# Patient Record
Sex: MALE | Race: WHITE | NOT HISPANIC OR LATINO | Employment: OTHER | URBAN - METROPOLITAN AREA
[De-identification: names, ages, dates, MRNs, and addresses within clinical notes are randomized per-mention and may not be internally consistent; named-entity substitution may affect disease eponyms.]

---

## 2017-02-17 ENCOUNTER — TRANSCRIBE ORDERS (OUTPATIENT)
Dept: ADMINISTRATIVE | Facility: HOSPITAL | Age: 82
End: 2017-02-17

## 2017-02-17 ENCOUNTER — APPOINTMENT (OUTPATIENT)
Dept: AUDIOLOGY | Facility: CLINIC | Age: 82
End: 2017-02-17
Payer: MEDICARE

## 2017-02-17 DIAGNOSIS — Z13.820 SPECIAL SCREENING FOR OSTEOPOROSIS: Primary | ICD-10-CM

## 2017-02-17 PROCEDURE — 92567 TYMPANOMETRY: CPT | Performed by: AUDIOLOGIST

## 2017-02-17 PROCEDURE — 92557 COMPREHENSIVE HEARING TEST: CPT | Performed by: AUDIOLOGIST

## 2017-03-03 ENCOUNTER — APPOINTMENT (OUTPATIENT)
Dept: RADIOLOGY | Facility: HOSPITAL | Age: 82
End: 2017-03-03
Attending: FAMILY MEDICINE
Payer: MEDICARE

## 2017-03-03 ENCOUNTER — HOSPITAL ENCOUNTER (OUTPATIENT)
Dept: RADIOLOGY | Facility: HOSPITAL | Age: 82
Discharge: HOME | End: 2017-03-03
Attending: FAMILY MEDICINE
Payer: MEDICARE

## 2017-03-03 DIAGNOSIS — Z13.820 SPECIAL SCREENING FOR OSTEOPOROSIS: ICD-10-CM

## 2017-03-03 DIAGNOSIS — Z13.820 ENCOUNTER FOR SCREENING FOR OSTEOPOROSIS: ICD-10-CM

## 2017-03-03 PROCEDURE — 77080 DXA BONE DENSITY AXIAL: CPT

## 2017-06-27 ENCOUNTER — APPOINTMENT (OUTPATIENT)
Dept: AUDIOLOGY | Facility: CLINIC | Age: 82
End: 2017-06-27
Payer: COMMERCIAL

## 2017-06-27 PROCEDURE — V5255 HEARING AID, DIGIT, MON, ITC: HCPCS | Performed by: AUDIOLOGIST

## 2017-07-01 ENCOUNTER — HOSPITAL ENCOUNTER (EMERGENCY)
Facility: HOSPITAL | Age: 82
Discharge: HOME | End: 2017-07-01
Attending: EMERGENCY MEDICINE | Admitting: EMERGENCY MEDICINE
Payer: MEDICARE

## 2017-07-01 VITALS
DIASTOLIC BLOOD PRESSURE: 74 MMHG | WEIGHT: 209 LBS | TEMPERATURE: 99.5 F | RESPIRATION RATE: 20 BRPM | SYSTOLIC BLOOD PRESSURE: 161 MMHG | HEART RATE: 79 BPM | OXYGEN SATURATION: 97 %
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
## 2017-07-01 PROCEDURE — 99282 EMERGENCY DEPT VISIT SF MDM: CPT

## 2017-07-01 RX ORDER — DOXYCYCLINE HYCLATE 100 MG/1
200 CAPSULE ORAL ONCE
Status: COMPLETED | OUTPATIENT
Start: 2017-07-01 | End: 2017-07-01

## 2017-07-01 RX ORDER — SIMVASTATIN 40 MG
40 TABLET ORAL
COMMUNITY
End: 2018-11-10 | Stop reason: SDUPTHER

## 2017-07-01 RX ORDER — LISINOPRIL AND HYDROCHLOROTHIAZIDE 20; 12.5 MG/1; MG/1
1 TABLET ORAL DAILY
COMMUNITY
End: 2018-11-10 | Stop reason: SDUPTHER

## 2017-07-01 RX ADMIN — DOXYCYCLINE 200 MG: 100 CAPSULE ORAL at 21:25

## 2017-10-09 ENCOUNTER — ALLSCRIPTS OFFICE VISIT (OUTPATIENT)
Dept: OTHER | Facility: OTHER | Age: 82
End: 2017-10-09

## 2017-11-02 ENCOUNTER — GENERIC CONVERSION - ENCOUNTER (OUTPATIENT)
Dept: OTHER | Facility: OTHER | Age: 82
End: 2017-11-02

## 2018-01-13 VITALS
BODY MASS INDEX: 30.64 KG/M2 | OXYGEN SATURATION: 99 % | SYSTOLIC BLOOD PRESSURE: 118 MMHG | DIASTOLIC BLOOD PRESSURE: 70 MMHG | RESPIRATION RATE: 16 BRPM | HEIGHT: 70 IN | WEIGHT: 214 LBS | HEART RATE: 72 BPM

## 2018-01-16 ENCOUNTER — HOSPITAL ENCOUNTER (OUTPATIENT)
Dept: RADIOLOGY | Facility: HOSPITAL | Age: 83
Discharge: HOME | End: 2018-01-16
Attending: FAMILY MEDICINE
Payer: MEDICARE

## 2018-01-16 ENCOUNTER — GENERIC CONVERSION - ENCOUNTER (OUTPATIENT)
Dept: OTHER | Facility: OTHER | Age: 83
End: 2018-01-16

## 2018-01-16 ENCOUNTER — APPOINTMENT (OUTPATIENT)
Dept: LAB | Facility: HOSPITAL | Age: 83
End: 2018-01-16
Attending: FAMILY MEDICINE
Payer: MEDICARE

## 2018-01-16 ENCOUNTER — TRANSCRIBE ORDERS (OUTPATIENT)
Dept: ADMINISTRATIVE | Facility: HOSPITAL | Age: 83
End: 2018-01-16

## 2018-01-16 DIAGNOSIS — J20.9 ACUTE BRONCHITIS: ICD-10-CM

## 2018-01-16 DIAGNOSIS — J20.9 ACUTE BRONCHITIS, UNSPECIFIED ORGANISM: Primary | ICD-10-CM

## 2018-01-16 LAB
ALBUMIN SERPL BCP-MCNC: 3 G/DL (ref 3.5–5)
ALP SERPL-CCNC: 71 U/L (ref 46–116)
ALT SERPL W P-5'-P-CCNC: 31 U/L (ref 12–78)
ANION GAP SERPL CALCULATED.3IONS-SCNC: 9 MMOL/L (ref 4–13)
AST SERPL W P-5'-P-CCNC: 24 U/L (ref 5–45)
BASOPHILS # BLD AUTO: 0 THOUSANDS/ΜL (ref 0–0.1)
BASOPHILS NFR BLD AUTO: 0 % (ref 0–1)
BILIRUB SERPL-MCNC: 2.1 MG/DL (ref 0.2–1)
BUN SERPL-MCNC: 21 MG/DL (ref 5–25)
CALCIUM SERPL-MCNC: 8.9 MG/DL (ref 8.3–10.1)
CHLORIDE SERPL-SCNC: 98 MMOL/L (ref 100–108)
CO2 SERPL-SCNC: 26 MMOL/L (ref 21–32)
CREAT SERPL-MCNC: 1.11 MG/DL (ref 0.6–1.3)
DOHLE BOD BLD QL SMEAR: PRESENT
EOSINOPHIL # BLD AUTO: 0 THOUSAND/ΜL (ref 0–0.61)
EOSINOPHIL NFR BLD AUTO: 0 % (ref 0–6)
ERYTHROCYTE [DISTWIDTH] IN BLOOD BY AUTOMATED COUNT: 14.1 % (ref 11.6–15.1)
GFR SERPL CREATININE-BSD FRML MDRD: 60 ML/MIN/1.73SQ M
GLUCOSE SERPL-MCNC: 166 MG/DL (ref 65–140)
HCT VFR BLD AUTO: 45.3 % (ref 42–52)
HGB BLD-MCNC: 15 G/DL (ref 14–18)
LYMPHOCYTES # BLD AUTO: 0.7 THOUSANDS/ΜL (ref 0.6–4.47)
LYMPHOCYTES NFR BLD AUTO: 5 % (ref 14–44)
MCH RBC QN AUTO: 30.2 PG (ref 27–31)
MCHC RBC AUTO-ENTMCNC: 33.2 G/DL (ref 31.4–37.4)
MCV RBC AUTO: 91 FL (ref 82–98)
MONOCYTES # BLD AUTO: 1.1 THOUSAND/ΜL (ref 0.17–1.22)
MONOCYTES NFR BLD AUTO: 7 % (ref 4–12)
NEUTROPHILS # BLD AUTO: 14.2 THOUSANDS/ΜL (ref 1.85–7.62)
NEUTS SEG NFR BLD AUTO: 89 % (ref 43–75)
NRBC BLD AUTO-RTO: 0 /100 WBCS
OVALOCYTES BLD QL SMEAR: PRESENT
PLATELET # BLD AUTO: 293 THOUSANDS/UL (ref 130–400)
PLATELET BLD QL SMEAR: ADEQUATE
PMV BLD AUTO: 7.8 FL (ref 8.9–12.7)
POLYCHROMASIA BLD QL SMEAR: PRESENT
POTASSIUM SERPL-SCNC: 3.6 MMOL/L (ref 3.5–5.3)
PROT SERPL-MCNC: 7.8 G/DL (ref 6.4–8.2)
RBC # BLD AUTO: 4.99 MILLION/UL (ref 4.7–6.1)
SODIUM SERPL-SCNC: 133 MMOL/L (ref 136–145)
WBC # BLD AUTO: 16 THOUSAND/UL (ref 4.8–10.8)

## 2018-01-16 PROCEDURE — 36415 COLL VENOUS BLD VENIPUNCTURE: CPT

## 2018-01-16 PROCEDURE — 85025 COMPLETE CBC W/AUTO DIFF WBC: CPT

## 2018-01-16 PROCEDURE — 71046 X-RAY EXAM CHEST 2 VIEWS: CPT

## 2018-01-16 PROCEDURE — 80053 COMPREHEN METABOLIC PANEL: CPT

## 2018-01-17 ENCOUNTER — GENERIC CONVERSION - ENCOUNTER (OUTPATIENT)
Dept: OTHER | Facility: OTHER | Age: 83
End: 2018-01-17

## 2018-01-23 NOTE — MISCELLANEOUS
Message  Message Free Text Note Form: Spoke with patient and wife both yesterday and today  His white count is 80005 and he has a left lower lobe pneumonia  He is feeling better with no dyspnea and no nausea or vomiting  He is able to eat and keep his medication down  If he develops fever chest pain nausea or vomiting, or dyspnea he is to go to the emergency room        Signatures   Electronically signed by : KANU Bazan ; Jan 17 2018  3:41PM EST                       (Author)

## 2018-01-24 VITALS
DIASTOLIC BLOOD PRESSURE: 80 MMHG | HEIGHT: 70 IN | WEIGHT: 210 LBS | SYSTOLIC BLOOD PRESSURE: 130 MMHG | BODY MASS INDEX: 30.06 KG/M2

## 2018-05-31 ENCOUNTER — HOSPITAL ENCOUNTER (EMERGENCY)
Facility: HOSPITAL | Age: 83
Discharge: HOME | End: 2018-05-31
Attending: EMERGENCY MEDICINE | Admitting: EMERGENCY MEDICINE
Payer: MEDICARE

## 2018-05-31 VITALS
SYSTOLIC BLOOD PRESSURE: 199 MMHG | RESPIRATION RATE: 18 BRPM | OXYGEN SATURATION: 97 % | DIASTOLIC BLOOD PRESSURE: 95 MMHG | WEIGHT: 201 LBS | BODY MASS INDEX: 29.26 KG/M2 | HEART RATE: 74 BPM | TEMPERATURE: 99.2 F
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
## 2018-05-31 PROCEDURE — 99281 EMR DPT VST MAYX REQ PHY/QHP: CPT

## 2018-05-31 RX ORDER — CEPHALEXIN 500 MG/1
500 CAPSULE ORAL ONCE
Status: COMPLETED | OUTPATIENT
Start: 2018-05-31 | End: 2018-05-31

## 2018-05-31 RX ORDER — CEPHALEXIN 500 MG/1
500 CAPSULE ORAL EVERY 12 HOURS SCHEDULED
Qty: 14 CAPSULE | Refills: 0 | Status: SHIPPED | OUTPATIENT
Start: 2018-05-31 | End: 2018-06-07

## 2018-05-31 RX ADMIN — CEPHALEXIN 500 MG: 500 CAPSULE ORAL at 13:16

## 2018-05-31 NOTE — DISCHARGE INSTRUCTIONS
Please take a list of all of your medications and discharge paperwork with you to all of your follow-up medical visits  Please take all of your medications as directed  Please call your family doctor or return to the ER if you have increased shortness of breath, chest pain, fevers, chills, nausea, vomiting, diarrhea, or any other worsening symptoms  Insect Bite or Sting   WHAT YOU NEED TO KNOW:   Most insect bites and stings are not dangerous and go away without treatment  Your symptoms may be mild, or you may develop anaphylaxis  Anaphylaxis is a sudden, life-threatening reaction that needs immediate treatment  Common examples of insects that bite or sting are bees, ticks, mosquitoes, spiders, and ants  Insect bites or stings can lead to diseases such as malaria, West Nile virus, Lyme disease, or [place] Spotted Fever  DISCHARGE INSTRUCTIONS:   Call 911 for signs or symptoms of anaphylaxis,  such as trouble breathing, swelling in your mouth or throat, or wheezing  You may also have itching, a rash, hives, or feel like you are going to faint  Return to the emergency department if:   · You are stung on your tongue or in your throat  · A white area forms around the bite  · You are sweating badly or have body pain  · You think you were bitten or stung by a poisonous insect  Contact your healthcare provider if:   · You have a fever  · The area becomes red, warm, tender, and swollen beyond the area of the bite or sting  · You have questions or concerns about your condition or care  Medicines:   · Antihistamines  decrease itching and rash  · Epinephrine  is used to treat severe allergic reactions such as anaphylaxis  · Take your medicine as directed  Contact your healthcare provider if you think your medicine is not helping or if you have side effects  Tell him of her if you are allergic to any medicine  Keep a list of the medicines, vitamins, and herbs you take   Include the amounts, and when and why you take them  Bring the list or the pill bottles to follow-up visits  Carry your medicine list with you in case of an emergency  Steps to take for signs or symptoms of anaphylaxis:   · Immediately  give 1 shot of epinephrine only into the outer thigh muscle  · Leave the shot in place  as directed  Your healthcare provider may recommend you leave it in place for up to 10 seconds before you remove it  This helps make sure all of the epinephrine is delivered  · Call 911 and go to the emergency department,  even if the shot improved symptoms  Do not drive yourself  Bring the used epinephrine shot with you  Safety precautions to take if you are at risk for anaphylaxis:   · Keep 2 shots of epinephrine with you at all times  You may need a second shot, because epinephrine only works for about 20 minutes and symptoms may return  Your healthcare provider can show you and family members how to give the shot  Check the expiration date every month and replace it before it expires  · Create an action plan  Your healthcare provider can help you create a written plan that explains the allergy and an emergency plan to treat a reaction  The plan explains when to give a second epinephrine shot if symptoms return or do not improve after the first  Give copies of the action plan and emergency instructions to family members, work and school staff, and  providers  Show them how to give a shot of epinephrine  · Carry medical alert identification  Wear medical alert jewelry or carry a card that says you have an insect allergy  Ask your healthcare provider where to get these items  If an insect bites or stings you:   · Remove the stinger  Scrape the stinger out with your fingernail, edge of a credit card, or a knife blade  Do not squeeze the wound  Gently wash the area with soap and water  · Remove the tick    Ticks must be removed as soon as possible so you do not get diseases passed through tick bites  Ask your healthcare provider for more information on tick bites and how to remove ticks  Care for a bite or sting wound:   · Elevate the affected area  Prop the wound above the level of your heart, if possible  Elevate the area for 10 to 20 minutes each hour or as directed by your healthcare provider  · Use compresses  Soak a clean washcloth in cold water, wring it out, and put it on the bite or sting  Use the compress for 10 to 20 minutes each hour or as directed by your healthcare provider  After 24 to 48 hours, change to warm compresses  · Apply a paste  Add water to baking soda to make a thick paste  Put the paste on the area for 5 minutes  Rinse gently to remove the paste  Prevent another insect bite or sting:   · Do not wear bright-colored or flower-print clothing when you plan to spend time outdoors  Do not use hairspray, perfumes, or aftershave  · Do not leave food out  · Empty any standing water and wash container with soap and water every 2 days  · Put screens on all open windows and doors  · Put insect repellent that contains DEET on skin that is showing when you go outside  Put insect repellent at the top of your boots, bottom of pant legs, and sleeve cuffs  Wear long sleeves, pants, and shoes  · Use citronella candles outdoors to help keep mosquitoes away  Put a tick and flea collar on pets  Follow up with your healthcare provider as directed:  Write down your questions so you remember to ask them during your visits  © 2017 [employer]  Information is for End User's use only and may not be sold, redistributed or otherwise used for commercial purposes  All illustrations and images included in CareNotes® are the copyrighted property of [employer]  or [employer]  The above information is an  only  It is not intended as medical advice for individual conditions or treatments   Talk to your doctor, nurse or pharmacist before following any medical regimen to see if it is safe and effective for you

## 2018-05-31 NOTE — ED NOTES
Patient informed that he will wait one half hour past the time he is given antibiotic to observe him and make sure he does not have an allergic reaction       Maureen Fermin RN  05/31/18 6989

## 2018-05-31 NOTE — ED PROVIDER NOTES
History  Chief Complaint   Patient presents with   [address]     patient was bit by something on right lower back area is slightly hard and red to touch denies pain but does itch     80year-old male presents to the ER for evaluation of an insect bite  Patient states that he was bit by something a few days ago and now the area feels indurated and the redness is spreading  Patient does not feel any pain but does have lot of itchiness  Patient came to the ER for further evaluation  Patient denies any fevers, chills, nausea, vomiting, diarrhea, dysuria, headaches, weakness, dizziness  History provided by:  Patient  Insect Bite   Associated symptoms: rash    Associated symptoms: no fever        Prior to Admission Medications   Prescriptions Last Dose Informant Patient Reported? Taking? AMLODIPINE BESYLATE PO   Yes No   Sig: Take 5 mg by mouth daily   lisinopril-hydrochlorothiazide (PRINZIDE,ZESTORETIC) 20-12 5 MG per tablet   Yes No   Sig: Take 1 tablet by mouth daily   metoprolol tartrate (LOPRESSOR) 25 mg tablet   Yes No   Sig: Take 25 mg by mouth every 12 (twelve) hours   simvastatin (ZOCOR) 40 mg tablet   Yes No   Sig: Take 40 mg by mouth daily at bedtime      Facility-Administered Medications: None       Past Medical History:   Diagnosis Date    Hyperlipidemia     Hypertension        Past Surgical History:   Procedure Laterality Date    A-V CARDIAC PACEMAKER INSERTION         History reviewed  No pertinent family history  I have reviewed and agree with the history as documented  Social History   Substance Use Topics    Smoking status: Former Smoker    Smokeless tobacco: Never Used    Alcohol use No        Review of Systems   Constitutional: Negative for activity change, fatigue and fever  HENT: Negative for congestion, ear discharge and sore throat  Eyes: Negative for pain and redness  Respiratory: Negative for cough, chest tightness, shortness of breath and wheezing  Cardiovascular: Negative for chest pain  Gastrointestinal: Negative for abdominal pain, diarrhea, nausea and vomiting  Endocrine: Negative for cold intolerance  Genitourinary: Negative for dysuria and urgency  Musculoskeletal: Negative for arthralgias and back pain  Skin: Positive for rash  Neurological: Negative for dizziness, weakness and headaches  Psychiatric/Behavioral: Negative for agitation and behavioral problems  Physical Exam  Physical Exam   Constitutional: He is oriented to person, place, and time  He appears well-developed and well-nourished  HENT:   Head: Normocephalic and atraumatic  Nose: Nose normal    Mouth/Throat: Oropharynx is clear and moist    Eyes: Conjunctivae and EOM are normal    Neck: Normal range of motion  Neck supple  Cardiovascular: Normal rate, regular rhythm and normal heart sounds  Pulmonary/Chest: Effort normal and breath sounds normal    Abdominal: Soft  Bowel sounds are normal  He exhibits no distension  There is no tenderness  Musculoskeletal: Normal range of motion  Neurological: He is alert and oriented to person, place, and time  Skin: Skin is warm  0 5 cm x 0 5 cm area of induration noted to the right flank region of the lower back with some surrounding erythema that is spreading laterally  The area is not warm to touch or tender to palpation  There area is pruritic in nature  No area of fluctuance or discharge noted on examination  Psychiatric: He has a normal mood and affect  His behavior is normal  Judgment and thought content normal    Nursing note and vitals reviewed        Vital Signs  ED Triage Vitals [05/31/18 1221]   Temperature Pulse Respirations Blood Pressure SpO2   99 2 °F (37 3 °C) 74 18 (!) 199/95 97 %      Temp Source Heart Rate Source Patient Position - Orthostatic VS BP Location FiO2 (%)   Tympanic Monitor Sitting Right arm --      Pain Score       --           Vitals:    05/31/18 1221   BP: (!) 199/95   Pulse: 74 Patient Position - Orthostatic VS: Sitting       Visual Acuity      ED Medications  Medications   cephalexin (KEFLEX) capsule 500 mg (500 mg Oral Given 5/31/18 1316)       Diagnostic Studies  Results Reviewed     None                 No orders to display              Procedures  Procedures       Phone Contacts  ED Phone Contact    ED Course                               MDM  Number of Diagnoses or Management Options  Bug bite with infection, initial encounter: new and requires workup  Risk of Complications, Morbidity, and/or Mortality  General comments: Patient's history and physical is consistent with infected insect bite  Patient has history of penicillin allergy  Patient could not recall whether he has ever taken Keflex before  Patient was given Keflex in the ER and will observed for half an hour without any worsening symptoms  At this point patient was discharged home on Keflex and hydrocortisone ointment with follow-up to PCP in3-4 days  Close return instructions given to return to the ER for any worsening symptoms  Patient agrees with discharge plan  Patient well appearing at time of discharge  Patient Progress  Patient progress: stable    CritCare Time    Disposition  Final diagnoses:   Bug bite with infection, initial encounter     Time reflects when diagnosis was documented in both MDM as applicable and the Disposition within this note     Time User Action Codes Description Comment    5/31/2018  1:05 PM Shawn Goyal Add Myrna Maurice  XXXA] Bug bite with infection, initial encounter       ED Disposition     ED Disposition Condition Comment    Discharge  Anson Ana Paula discharge to home/self care      Condition at discharge: Good        Follow-up Information     Follow up With Specialties Details Why [address], MD Family Medicine In 4 days  [facility]  [address] [place] [address]  [phone]            Discharge Medication List as of 5/31/2018  1:10 PM      START taking these medications    Details   cephalexin (KEFLEX) 500 mg capsule Take 1 capsule (500 mg total) by mouth every 12 (twelve) hours for 7 days, Starting u 5/31/2018, Until u 6/7/2018, Print      hydrocortisone 2 5 % ointment Apply topically 2 (two) times a day for 7 days, Starting u 5/31/2018, Until u 6/7/2018, Print         CONTINUE these medications which have NOT CHANGED    Details   AMLODIPINE BESYLATE PO Take 5 mg by mouth daily, Historical Med      lisinopril-hydrochlorothiazide (PRINZIDE,ZESTORETIC) 20-12 5 MG per tablet Take 1 tablet by mouth daily, Historical Med      metoprolol tartrate (LOPRESSOR) 25 mg tablet Take 25 mg by mouth every 12 (twelve) hours, Historical Med      simvastatin (ZOCOR) 40 mg tablet Take 40 mg by mouth daily at bedtime, Historical Med           No discharge procedures on file      ED Provider  Electronically Signed by           Kary Rao DO  05/31/18 0512

## 2018-06-05 ENCOUNTER — VBI (OUTPATIENT)
Dept: FAMILY MEDICINE CLINIC | Facility: CLINIC | Age: 83
End: 2018-06-05

## 2018-06-05 NOTE — TELEPHONE ENCOUNTER
Pt was seen in [address] on 5/31/18  CC: Insect Bite  DX: Bug bite with infection  Left message  Informed Pt of  on call, office hours and phone number

## 2018-06-25 ENCOUNTER — APPOINTMENT (OUTPATIENT)
Dept: RADIOLOGY | Facility: CLINIC | Age: 83
End: 2018-06-25
Payer: MEDICARE

## 2018-06-25 VITALS
HEIGHT: 67 IN | HEART RATE: 60 BPM | WEIGHT: 208 LBS | BODY MASS INDEX: 32.65 KG/M2 | DIASTOLIC BLOOD PRESSURE: 80 MMHG | SYSTOLIC BLOOD PRESSURE: 171 MMHG

## 2018-06-25 DIAGNOSIS — M25.561 PAIN IN BOTH KNEES, UNSPECIFIED CHRONICITY: ICD-10-CM

## 2018-06-25 DIAGNOSIS — M25.562 PAIN IN BOTH KNEES, UNSPECIFIED CHRONICITY: ICD-10-CM

## 2018-06-25 DIAGNOSIS — M17.0 PRIMARY OSTEOARTHRITIS OF BOTH KNEES: Primary | ICD-10-CM

## 2018-06-25 PROCEDURE — 73562 X-RAY EXAM OF KNEE 3: CPT

## 2018-06-25 PROCEDURE — 20610 DRAIN/INJ JOINT/BURSA W/O US: CPT | Performed by: ORTHOPAEDIC SURGERY

## 2018-06-25 PROCEDURE — 99213 OFFICE O/P EST LOW 20 MIN: CPT | Performed by: ORTHOPAEDIC SURGERY

## 2018-06-25 RX ORDER — DEXAMETHASONE SODIUM PHOSPHATE 100 MG/10ML
40 INJECTION INTRAMUSCULAR; INTRAVENOUS
Status: COMPLETED | OUTPATIENT
Start: 2018-06-25 | End: 2018-06-25

## 2018-06-25 RX ORDER — LIDOCAINE HYDROCHLORIDE 10 MG/ML
4 INJECTION, SOLUTION INFILTRATION; PERINEURAL
Status: COMPLETED | OUTPATIENT
Start: 2018-06-25 | End: 2018-06-25

## 2018-06-25 RX ADMIN — DEXAMETHASONE SODIUM PHOSPHATE 40 MG: 100 INJECTION INTRAMUSCULAR; INTRAVENOUS at 11:33

## 2018-06-25 RX ADMIN — LIDOCAINE HYDROCHLORIDE 4 ML: 10 INJECTION, SOLUTION INFILTRATION; PERINEURAL at 11:33

## 2018-06-25 RX ADMIN — DEXAMETHASONE SODIUM PHOSPHATE 40 MG: 100 INJECTION INTRAMUSCULAR; INTRAVENOUS at 11:37

## 2018-06-25 RX ADMIN — LIDOCAINE HYDROCHLORIDE 4 ML: 10 INJECTION, SOLUTION INFILTRATION; PERINEURAL at 11:37

## 2018-07-14 ENCOUNTER — HOSPITAL ENCOUNTER (EMERGENCY)
Facility: HOSPITAL | Age: 83
Discharge: HOME | End: 2018-07-14
Attending: EMERGENCY MEDICINE | Admitting: EMERGENCY MEDICINE
Payer: MEDICARE

## 2018-07-14 VITALS
DIASTOLIC BLOOD PRESSURE: 88 MMHG | RESPIRATION RATE: 20 BRPM | OXYGEN SATURATION: 99 % | SYSTOLIC BLOOD PRESSURE: 203 MMHG | TEMPERATURE: 98.9 F | HEART RATE: 94 BPM

## 2018-07-14 DIAGNOSIS — S80.861A TICK BITE OF RIGHT LOWER LEG, INITIAL ENCOUNTER: Primary | ICD-10-CM
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
## 2018-07-14 PROCEDURE — 99281 EMR DPT VST MAYX REQ PHY/QHP: CPT

## 2018-07-14 RX ORDER — MUPIROCIN CALCIUM 20 MG/G
CREAM TOPICAL 3 TIMES DAILY
Qty: 15 G | Refills: 0 | Status: SHIPPED | OUTPATIENT
Start: 2018-07-14 | End: 2021-12-20

## 2018-07-14 RX ORDER — DOXYCYCLINE HYCLATE 100 MG/1
200 CAPSULE ORAL ONCE
Status: DISCONTINUED | OUTPATIENT
Start: 2018-07-14 | End: 2018-07-14 | Stop reason: HOSPADM

## 2018-07-14 NOTE — ED PROVIDER NOTES
History  Chief Complaint   Patient presents with   [address]     Pt states he came in to be checked out after pulling engourged tick off of his RLE     Pt presents after a tick bite  He states that he noticed the tick tonight, pulled it off and came to the ER for further evaluation  Pt denies other somatic complaints            Prior to Admission Medications   Prescriptions Last Dose Informant Patient Reported? Taking? AMLODIPINE BESYLATE PO   Yes No   Sig: Take 5 mg by mouth daily   hydrocortisone 2 5 % ointment   No No   Sig: Apply topically 2 (two) times a day for 7 days   lisinopril-hydrochlorothiazide (PRINZIDE,ZESTORETIC) 20-12 5 MG per tablet   Yes No   Sig: Take 1 tablet by mouth daily   metoprolol tartrate (LOPRESSOR) 25 mg tablet   Yes No   Sig: Take 25 mg by mouth every 12 (twelve) hours   simvastatin (ZOCOR) 40 mg tablet   Yes No   Sig: Take 40 mg by mouth daily at bedtime      Facility-Administered Medications: None       Past Medical History:   Diagnosis Date    Hyperlipidemia     Hypertension        Past Surgical History:   Procedure Laterality Date    A-V CARDIAC PACEMAKER INSERTION         History reviewed  No pertinent family history  I have reviewed and agree with the history as documented  Social History   Substance Use Topics    Smoking status: Former Smoker    Smokeless tobacco: Never Used    Alcohol use No        Review of Systems   Constitutional: Negative for chills and fever  All other systems reviewed and are negative  Physical Exam  Physical Exam   Constitutional: He appears well-developed and well-nourished  Skin:        Scab noted laterally on right leg  Nursing note and vitals reviewed        Vital Signs  ED Triage Vitals [07/14/18 0042]   Temperature Pulse Respirations Blood Pressure SpO2   98 9 °F (37 2 °C) 94 20 (!) 203/88 99 %      Temp src Heart Rate Source Patient Position - Orthostatic VS BP Location FiO2 (%)   -- Monitor Sitting Right arm -- Pain Score       No Pain           Vitals:    07/14/18 0042   BP: (!) 203/88   Pulse: 94   Patient Position - Orthostatic VS: Sitting       Visual Acuity      ED Medications  Medications - No data to display    Diagnostic Studies  Results Reviewed     None                 No orders to display              Procedures  Procedures       Phone Contacts  ED Phone Contact    ED Course                               MDM  Number of Diagnoses or Management Options  Diagnosis management comments: Wound cleaned by RN  Will give prophylactic dose of doxy and d/c to home    CritCare Time    Disposition  Final diagnoses:   Tick bite of right lower leg, initial encounter     Time reflects when diagnosis was documented in both MDM as applicable and the Disposition within this note     Time User Action Codes Description Comment    7/14/2018  2:00 AM Josef Werner Add [[id number],  W57  XXXA] Tick bite of right lower leg, initial encounter       ED Disposition     ED Disposition Condition Comment    Discharge  Leslee Boateng discharge to home/self care      Condition at discharge: Stable        Follow-up Information     Follow up With Specialties Details Why [facility], [address]   [facility]  [address] [place] [address]  [phone]            Discharge Medication List as of 7/14/2018  2:01 AM      START taking these medications    Details   mupirocin (BACTROBAN) 2 % cream Apply topically 3 (three) times a day, Starting Sat 7/14/2018, Normal         CONTINUE these medications which have NOT CHANGED    Details   AMLODIPINE BESYLATE PO Take 5 mg by mouth daily, Historical Med      hydrocortisone 2 5 % ointment Apply topically 2 (two) times a day for 7 days, Starting Thu 5/31/2018, Until Thu 6/7/2018, Print      lisinopril-hydrochlorothiazide (PRINZIDE,ZESTORETIC) 20-12 5 MG per tablet Take 1 tablet by mouth daily, Historical Med      metoprolol tartrate (LOPRESSOR) 25 mg tablet Take 25 mg by mouth every 12 (twelve) hours, Historical Med      simvastatin (ZOCOR) 40 mg tablet Take 40 mg by mouth daily at bedtime, Historical Med           No discharge procedures on file      ED Provider  Electronically Signed by           Elver Flores DO  07/14/18 0684

## 2018-07-14 NOTE — DISCHARGE INSTRUCTIONS
Tick Bite   WHAT YOU NEED TO KNOW:   Most tick bites are not dangerous, but ticks can pass disease or infection when they bite  Ticks need to be removed quickly  You may have redness, pain, itching, and swelling near the bite  Blisters may also develop  DISCHARGE INSTRUCTIONS:   Return to the emergency department if:   · You have trouble walking or moving your legs  · You have joint pain, muscle pain, or muscle weakness within 1 month of a tick bite  · You have a fever, chills, headache, or rash  Contact your healthcare provider if:   · You cannot remove the tick  · The tick's head is stuck in your skin  · You have questions or concerns about your condition or care  Medicines:   · Medicines  help decrease pain, redness, itching, and swelling  You may also need medicine to prevent or fight a bacterial infection  These medicines may be given as a cream, lotion, or pill  · Take your medicine as directed  Contact your healthcare provider if you think your medicine is not helping or if you have side effects  Tell him of her if you are allergic to any medicine  Keep a list of the medicines, vitamins, and herbs you take  Include the amounts, and when and why you take them  Bring the list or the pill bottles to follow-up visits  Carry your medicine list with you in case of an emergency  How to remove a tick:  Remove the tick as soon as possible to help prevent disease or infection  You are less likely to get sick from a tick bite if you remove the tick within 24 hours  Do not use petroleum jelly, nail polish, rubbing alcohol, or heat  These do not work and may be dangerous  Do the following to remove a tick:  · First, try a soapy cotton ball  Soak a cotton ball in liquid soap  Cover the tick with the cotton ball for 30 seconds  The tick may come off with the cotton ball when you pull it away  · Use tweezers if the soapy cotton ball does not work  Grasp the tick as close to your skin as possible  Pull the tick straight up and out  Do not touch the tick with your bare hands  · Do not twist or jerk the tick suddenly, because this may break off the tick's head or mouth parts  Do not leave any part of the tick in your skin  · Do not crush or squeeze the tick since its body may be infected with germs  Flush the tick down the toilet  · After the tick is removed, clean the area of the bite with rubbing alcohol  Then wash your hands with soap and water  Apply ice  on your bite for 15 to 20 minutes every hour or as directed  Use an ice pack, or put crushed ice in a plastic bag  Cover it with a towel before you apply it to your skin  Ice helps prevent tissue damage and decreases swelling and pain  Prevent a tick bite:  Ticks live in areas covered by brush and grass  They may even be found in your lawn if you live in certain areas  Outdoor pets can carry ticks inside the house  Ticks can grab onto you or your clothes when you walk by grass or brush  If you go into areas that contain many trees, tall grasses, and underbrush, do the following:  · Wear light colored pants and a long-sleeved shirt  Tuck your pants into your socks or boots  Tuck in your shirt  Wear sleeves that fit close to the skin at your wrists and neck  This will help prevent ticks from crawling through gaps in your clothing and onto your skin  Wear a hat in areas with trees  · Apply insect repellant on your skin  The insect repellant should contain DEET  Do not put insect repellant on skin that is cut, scratched, or irritated  Always use soap and water to wash the insect repellant off as soon as possible once you are indoors  Do not apply insect repellant on your child's face or hands  · Spray insect repellant onto your clothes  Use permethrin spray  This spray kills ticks that crawl on your clothing  Be sure to spray the tops of your boots, bottom of pant legs, and sleeve cuffs   As soon as possible, wash and dry clothing in hot water and high heat  · Check your clothing, hair, and skin for ticks  Shower within 2 hours of coming indoors  Carefully check the hairline, armpits, neck, and waist  Check your pets and children for ticks  Remove ticks from pets the same way as you remove them from people  · Decrease the risk for ticks in your yard  Ticks like to live in shady, moist areas  [place] your lawn regularly to keep the grass short  Trim the grass around birdbaths and fences  Cut branches that are overgrown and take them out of the yard  Clear out leaf piles  [place] firewood in a dry, jame area  Follow up with your healthcare provider as directed:  Write down your questions so you remember to ask them during your visits  © 2017 [employer] Information is for End User's use only and may not be sold, redistributed or otherwise used for commercial purposes  All illustrations and images included in CareNotes® are the copyrighted property of [employer]  or [employer]  The above information is an  only  It is not intended as medical advice for individual conditions or treatments  Talk to your doctor, nurse or pharmacist before following any medical regimen to see if it is safe and effective for you

## 2018-07-16 ENCOUNTER — VBI (OUTPATIENT)
Dept: FAMILY MEDICINE CLINIC | Facility: CLINIC | Age: 83
End: 2018-07-16

## 2018-07-16 NOTE — TELEPHONE ENCOUNTER
Pt was seen in [address] on 7/14/18,  CC: Insect Bite  DX: Tick bite of right lower leg  Pt will call back if F/u appt is needed

## 2018-09-05 ENCOUNTER — TRANSCRIBE ORDERS (OUTPATIENT)
Dept: ADMINISTRATIVE | Facility: HOSPITAL | Age: 83
End: 2018-09-05

## 2018-09-05 DIAGNOSIS — R60.9 EDEMA, UNSPECIFIED TYPE: Primary | ICD-10-CM

## 2018-09-11 ENCOUNTER — HOSPITAL ENCOUNTER (OUTPATIENT)
Dept: RADIOLOGY | Facility: HOSPITAL | Age: 83
Discharge: HOME | End: 2018-09-11
Payer: MEDICARE

## 2018-09-11 DIAGNOSIS — R60.9 EDEMA, UNSPECIFIED TYPE: ICD-10-CM

## 2018-09-11 PROCEDURE — 93971 EXTREMITY STUDY: CPT

## 2018-09-12 PROCEDURE — 93971 EXTREMITY STUDY: CPT | Performed by: SURGERY

## 2018-11-10 DIAGNOSIS — E78.5 HYPERLIPIDEMIA, UNSPECIFIED HYPERLIPIDEMIA TYPE: ICD-10-CM

## 2018-11-10 DIAGNOSIS — I10 ESSENTIAL HYPERTENSION: Primary | ICD-10-CM

## 2018-11-10 RX ORDER — LISINOPRIL AND HYDROCHLOROTHIAZIDE 20; 12.5 MG/1; MG/1
TABLET ORAL
Qty: 90 TABLET | Refills: 3 | Status: SHIPPED | OUTPATIENT
Start: 2018-11-10 | End: 2019-12-22 | Stop reason: SDUPTHER

## 2018-11-10 RX ORDER — SIMVASTATIN 40 MG
TABLET ORAL
Qty: 90 TABLET | Refills: 3 | Status: SHIPPED | OUTPATIENT
Start: 2018-11-10 | End: 2019-12-22 | Stop reason: SDUPTHER

## 2018-11-10 RX ORDER — AMLODIPINE BESYLATE 5 MG/1
TABLET ORAL
Qty: 90 TABLET | Refills: 3 | Status: SHIPPED | OUTPATIENT
Start: 2018-11-10 | End: 2019-12-28 | Stop reason: SDUPTHER

## 2019-09-10 ENCOUNTER — APPOINTMENT (OUTPATIENT)
Dept: LAB | Facility: HOSPITAL | Age: 84
End: 2019-09-10
Payer: MEDICARE

## 2019-09-10 ENCOUNTER — TRANSCRIBE ORDERS (OUTPATIENT)
Dept: ADMINISTRATIVE | Facility: HOSPITAL | Age: 84
End: 2019-09-10

## 2019-09-10 DIAGNOSIS — I10 ESSENTIAL HYPERTENSION, MALIGNANT: ICD-10-CM

## 2019-09-10 DIAGNOSIS — E78.2 MIXED HYPERLIPIDEMIA: ICD-10-CM

## 2019-09-10 DIAGNOSIS — I10 ESSENTIAL HYPERTENSION, MALIGNANT: Primary | ICD-10-CM

## 2019-09-10 DIAGNOSIS — R60.0 LOCALIZED EDEMA: ICD-10-CM

## 2019-09-10 LAB
ALBUMIN SERPL BCP-MCNC: 3.6 G/DL (ref 3.5–5)
ALP SERPL-CCNC: 38 U/L (ref 46–116)
ALT SERPL W P-5'-P-CCNC: 29 U/L (ref 12–78)
ANION GAP SERPL CALCULATED.3IONS-SCNC: 8 MMOL/L (ref 4–13)
AST SERPL W P-5'-P-CCNC: 27 U/L (ref 5–45)
BILIRUB SERPL-MCNC: 1 MG/DL (ref 0.2–1)
BILIRUB UR QL STRIP: NEGATIVE
BUN SERPL-MCNC: 25 MG/DL (ref 5–25)
CALCIUM SERPL-MCNC: 8.8 MG/DL (ref 8.3–10.1)
CHLORIDE SERPL-SCNC: 103 MMOL/L (ref 100–108)
CHOLEST SERPL-MCNC: 92 MG/DL (ref 50–200)
CK SERPL-CCNC: 70 U/L (ref 39–308)
CLARITY UR: CLEAR
CO2 SERPL-SCNC: 29 MMOL/L (ref 21–32)
COLOR UR: YELLOW
CREAT SERPL-MCNC: 1.18 MG/DL (ref 0.6–1.3)
GFR SERPL CREATININE-BSD FRML MDRD: 55 ML/MIN/1.73SQ M
GLUCOSE P FAST SERPL-MCNC: 135 MG/DL (ref 65–99)
GLUCOSE UR STRIP-MCNC: NEGATIVE MG/DL
HDLC SERPL-MCNC: 28 MG/DL (ref 40–60)
HGB UR QL STRIP.AUTO: NEGATIVE
KETONES UR STRIP-MCNC: NEGATIVE MG/DL
LDLC SERPL CALC-MCNC: 43 MG/DL (ref 0–100)
LEUKOCYTE ESTERASE UR QL STRIP: NEGATIVE
MAGNESIUM SERPL-MCNC: 2.2 MG/DL (ref 1.6–2.6)
NITRITE UR QL STRIP: NEGATIVE
NONHDLC SERPL-MCNC: 64 MG/DL
NT-PROBNP SERPL-MCNC: 103 PG/ML
PH UR STRIP.AUTO: 6 [PH]
POTASSIUM SERPL-SCNC: 3.2 MMOL/L (ref 3.5–5.3)
PROT SERPL-MCNC: 7.1 G/DL (ref 6.4–8.2)
PROT UR STRIP-MCNC: NEGATIVE MG/DL
SODIUM SERPL-SCNC: 140 MMOL/L (ref 136–145)
SP GR UR STRIP.AUTO: 1.01 (ref 1–1.03)
T4 FREE SERPL-MCNC: 0.9 NG/DL (ref 0.76–1.46)
TRIGL SERPL-MCNC: 107 MG/DL
TSH SERPL DL<=0.05 MIU/L-ACNC: 3.84 UIU/ML (ref 0.36–3.74)
UROBILINOGEN UR QL STRIP.AUTO: 0.2 E.U./DL

## 2019-09-10 PROCEDURE — 80061 LIPID PANEL: CPT | Performed by: INTERNAL MEDICINE

## 2019-09-10 PROCEDURE — 83735 ASSAY OF MAGNESIUM: CPT

## 2019-09-10 PROCEDURE — 87086 URINE CULTURE/COLONY COUNT: CPT

## 2019-09-10 PROCEDURE — 84443 ASSAY THYROID STIM HORMONE: CPT

## 2019-09-10 PROCEDURE — 83880 ASSAY OF NATRIURETIC PEPTIDE: CPT

## 2019-09-10 PROCEDURE — 84439 ASSAY OF FREE THYROXINE: CPT

## 2019-09-10 PROCEDURE — 36415 COLL VENOUS BLD VENIPUNCTURE: CPT

## 2019-09-10 PROCEDURE — 82550 ASSAY OF CK (CPK): CPT

## 2019-09-10 PROCEDURE — 80053 COMPREHEN METABOLIC PANEL: CPT | Performed by: INTERNAL MEDICINE

## 2019-09-10 PROCEDURE — 81003 URINALYSIS AUTO W/O SCOPE: CPT | Performed by: INTERNAL MEDICINE

## 2019-09-11 LAB — BACTERIA UR CULT: NORMAL

## 2019-10-11 ENCOUNTER — OFFICE VISIT (OUTPATIENT)
Dept: OBGYN CLINIC | Facility: CLINIC | Age: 84
End: 2019-10-11
Payer: MEDICARE

## 2019-10-11 VITALS
BODY MASS INDEX: 32.77 KG/M2 | DIASTOLIC BLOOD PRESSURE: 76 MMHG | SYSTOLIC BLOOD PRESSURE: 145 MMHG | WEIGHT: 209.2 LBS | HEART RATE: 60 BPM

## 2019-10-11 DIAGNOSIS — M17.0 PRIMARY OSTEOARTHRITIS OF BOTH KNEES: Primary | ICD-10-CM

## 2019-10-11 DIAGNOSIS — M25.561 PAIN IN BOTH KNEES, UNSPECIFIED CHRONICITY: ICD-10-CM

## 2019-10-11 DIAGNOSIS — M25.562 PAIN IN BOTH KNEES, UNSPECIFIED CHRONICITY: ICD-10-CM

## 2019-10-11 PROCEDURE — 99213 OFFICE O/P EST LOW 20 MIN: CPT | Performed by: ORTHOPAEDIC SURGERY

## 2019-10-11 NOTE — PROGRESS NOTES
Assessment/Plan:  1  Primary osteoarthritis of both knees     2  Pain in both knees, unspecified chronicity  XR knee 3 vw left non injury    XR knee 3 vw right non injury       Scribe Attestation    I,:   Lisbethcarter Justin am acting as a scribe while in the presence of the attending physician :        I,:   Michelle Montana MD personally performed the services described in this documentation    as scribed in my presence :              Jennifer Dubois upon examination and review of his x-rays from last year demonstrates severe tricompartmental osteoarthritis  He demonstrates a benign exam today, however is restricted into extension and flexion  Given that he is stable and is not painful today I do not recommend corticosteroid injections for him today and did encourage him to continue to be active as this will help mitigate stiffness development into his knees  Jennifersiria Dubois was agreeable to this and verbalized understanding to all the information provided to him today  Jennifersiria Dubois will follow up with me on an as-needed basis  Subjective:   Orlando Cordova is a 80 y o  male who presents to the office today for follow-up evaluation of his left and right knees  He did receive corticosteroid injections last year and notes that he did have minimal improvement  He states that he is relatively asymptomatic throughout his day continues to be active  However notes if he stands for long period time he will become painful to the anterior medial aspect of his knees  He denies any recent injury to his knees, he also denies any bouts of instability resulting in any falls  However does use a cane for ambulation  He states that he does experience swelling into the lower extremities however is being treated with water pills  He states that he denies any numbness or tingling sensations he also denies a history of diabetes  Review of Systems   Constitutional: Negative  HENT: Negative  Eyes: Negative  Respiratory: Negative  Cardiovascular: Negative  Gastrointestinal: Negative  Endocrine: Negative  Genitourinary: Negative  Skin: Negative  Allergic/Immunologic: Negative  Neurological: Negative  Hematological: Negative  Psychiatric/Behavioral: Negative  Past Medical History:   Diagnosis Date    Hyperlipidemia     Hypertension        Past Surgical History:   Procedure Laterality Date    A-V CARDIAC PACEMAKER INSERTION         History reviewed  No pertinent family history  Social History     Occupational History    Not on file   Tobacco Use    Smoking status: Former Smoker    Smokeless tobacco: Never Used   Substance and Sexual Activity    Alcohol use: No    Drug use: No    Sexual activity: Not on file         Current Outpatient Medications:     amLODIPine (NORVASC) 5 mg tablet, TAKE 1 TABLET BY MOUTH  DAILY, Disp: 90 tablet, Rfl: 3    AMLODIPINE BESYLATE PO, Take 5 mg by mouth daily, Disp: , Rfl:     hydrocortisone 2 5 % ointment, Apply topically 2 (two) times a day for 7 days, Disp: 30 g, Rfl: 0    lisinopril-hydrochlorothiazide (PRINZIDE,ZESTORETIC) 20-12 5 MG per tablet, TAKE 1 TABLET BY MOUTH  EVERY DAY, Disp: 90 tablet, Rfl: 3    metoprolol tartrate (LOPRESSOR) 25 mg tablet, TAKE 1 TABLET BY MOUTH  TWICE A DAY, Disp: 180 tablet, Rfl: 3    mupirocin (BACTROBAN) 2 % cream, Apply topically 3 (three) times a day, Disp: 15 g, Rfl: 0    simvastatin (ZOCOR) 40 mg tablet, TAKE 1 TABLET BY MOUTH  DAILY, Disp: 90 tablet, Rfl: 3    Allergies   Allergen Reactions    Penicillins     Tetanus-Diphth-Acell Pertussis [Tetanus-Diphth-Acell Pertussis]        Objective:  Vitals:    10/11/19 1430   BP: 145/76   Pulse: 60       Right Knee Exam     Tenderness   The patient is experiencing no tenderness       Range of Motion   Extension: -5   Flexion: 90     Tests   Stefanie:  Medial - negative Lateral - negative  Varus: negative Valgus: negative  Drawer:  Anterior - negative    Posterior - negative    Other   Erythema: absent  Scars: absent  Sensation: normal  Pulse: present  Swelling: moderate (Swelling into the lower leg and feet)      Left Knee Exam     Tenderness   The patient is experiencing no tenderness  Range of Motion   Extension: -5   Flexion: 90     Tests   Stefanie:  Medial - negative Lateral - negative  Varus: negative Valgus: negative  Drawer:  Anterior - negative     Posterior - negative    Other   Erythema: absent  Scars: absent  Sensation: normal  Pulse: present  Swelling: moderate (Swelling into the lower leg and feet)            Physical Exam   Constitutional: He is oriented to person, place, and time  He appears well-developed and well-nourished  HENT:   Head: Normocephalic and atraumatic  Eyes: Conjunctivae are normal  Right eye exhibits no discharge  Left eye exhibits no discharge  Neck: Normal range of motion  Neck supple  Cardiovascular: Normal rate and intact distal pulses  Pulmonary/Chest: Effort normal  No respiratory distress  Musculoskeletal:   As noted in HPI   Neurological: He is alert and oriented to person, place, and time  Skin: Skin is warm and dry  Psychiatric: He has a normal mood and affect  His behavior is normal  Judgment and thought content normal    Vitals reviewed  I have personally reviewed pertinent films in PACS and my interpretation is as follows:    Review the x-ray his left and right knees from June 2018 demonstrates severe tricompartmental osteoarthritis

## 2019-10-21 ENCOUNTER — APPOINTMENT (OUTPATIENT)
Dept: LAB | Facility: HOSPITAL | Age: 84
End: 2019-10-21
Payer: MEDICARE

## 2019-10-21 ENCOUNTER — TRANSCRIBE ORDERS (OUTPATIENT)
Dept: ADMINISTRATIVE | Facility: HOSPITAL | Age: 84
End: 2019-10-21

## 2019-10-21 DIAGNOSIS — R60.0 LOCALIZED EDEMA: ICD-10-CM

## 2019-10-21 DIAGNOSIS — I10 ESSENTIAL HYPERTENSION, MALIGNANT: ICD-10-CM

## 2019-10-21 DIAGNOSIS — R60.0 LOCALIZED EDEMA: Primary | ICD-10-CM

## 2019-10-21 LAB
ANION GAP SERPL CALCULATED.3IONS-SCNC: 11 MMOL/L (ref 4–13)
BUN SERPL-MCNC: 20 MG/DL (ref 5–25)
CALCIUM SERPL-MCNC: 9.3 MG/DL (ref 8.3–10.1)
CHLORIDE SERPL-SCNC: 105 MMOL/L (ref 100–108)
CO2 SERPL-SCNC: 25 MMOL/L (ref 21–32)
CREAT SERPL-MCNC: 1.03 MG/DL (ref 0.6–1.3)
GFR SERPL CREATININE-BSD FRML MDRD: 65 ML/MIN/1.73SQ M
GLUCOSE P FAST SERPL-MCNC: 139 MG/DL (ref 65–99)
MAGNESIUM SERPL-MCNC: 2.2 MG/DL (ref 1.6–2.6)
POTASSIUM SERPL-SCNC: 3.5 MMOL/L (ref 3.5–5.3)
SODIUM SERPL-SCNC: 141 MMOL/L (ref 136–145)

## 2019-10-21 PROCEDURE — 36415 COLL VENOUS BLD VENIPUNCTURE: CPT

## 2019-10-21 PROCEDURE — 80048 BASIC METABOLIC PNL TOTAL CA: CPT

## 2019-10-21 PROCEDURE — 83735 ASSAY OF MAGNESIUM: CPT

## 2019-11-09 DIAGNOSIS — E78.5 HYPERLIPIDEMIA, UNSPECIFIED HYPERLIPIDEMIA TYPE: ICD-10-CM

## 2019-11-09 DIAGNOSIS — I10 ESSENTIAL HYPERTENSION: ICD-10-CM

## 2019-11-11 RX ORDER — AMLODIPINE BESYLATE 5 MG/1
TABLET ORAL
Qty: 90 TABLET | Refills: 3 | OUTPATIENT
Start: 2019-11-11

## 2019-11-11 RX ORDER — LISINOPRIL AND HYDROCHLOROTHIAZIDE 20; 12.5 MG/1; MG/1
TABLET ORAL
Qty: 90 TABLET | Refills: 3 | OUTPATIENT
Start: 2019-11-11

## 2019-11-11 RX ORDER — SIMVASTATIN 40 MG
TABLET ORAL
Qty: 90 TABLET | Refills: 3 | OUTPATIENT
Start: 2019-11-11

## 2019-11-11 NOTE — TELEPHONE ENCOUNTER
I had not seen this patient for 2 years since 2017, I can not remember who is he anymore, have him come in to see me

## 2019-12-22 DIAGNOSIS — E78.5 HYPERLIPIDEMIA, UNSPECIFIED HYPERLIPIDEMIA TYPE: ICD-10-CM

## 2019-12-22 DIAGNOSIS — I10 ESSENTIAL HYPERTENSION: ICD-10-CM

## 2019-12-22 RX ORDER — LISINOPRIL AND HYDROCHLOROTHIAZIDE 20; 12.5 MG/1; MG/1
TABLET ORAL
Qty: 90 TABLET | Refills: 3 | Status: SHIPPED | OUTPATIENT
Start: 2019-12-22 | End: 2020-10-21 | Stop reason: SDUPTHER

## 2019-12-22 RX ORDER — SIMVASTATIN 40 MG
TABLET ORAL
Qty: 90 TABLET | Refills: 3 | Status: SHIPPED | OUTPATIENT
Start: 2019-12-22 | End: 2020-10-21 | Stop reason: SDUPTHER

## 2019-12-28 DIAGNOSIS — Z23 NEED FOR SHINGLES VACCINE: Primary | ICD-10-CM

## 2019-12-28 DIAGNOSIS — I10 ESSENTIAL HYPERTENSION: ICD-10-CM

## 2019-12-28 RX ORDER — AMLODIPINE BESYLATE 5 MG/1
TABLET ORAL
Qty: 90 TABLET | Refills: 3 | Status: SHIPPED | OUTPATIENT
Start: 2019-12-28 | End: 2021-08-14 | Stop reason: SDUPTHER

## 2020-07-24 ENCOUNTER — TRANSCRIBE ORDERS (OUTPATIENT)
Dept: ADMINISTRATIVE | Facility: HOSPITAL | Age: 85
End: 2020-07-24

## 2020-07-28 ENCOUNTER — HOSPITAL ENCOUNTER (EMERGENCY)
Facility: HOSPITAL | Age: 85
Discharge: HOME | End: 2020-07-28
Attending: EMERGENCY MEDICINE | Admitting: EMERGENCY MEDICINE
Payer: MEDICARE

## 2020-07-28 VITALS
SYSTOLIC BLOOD PRESSURE: 113 MMHG | OXYGEN SATURATION: 98 % | DIASTOLIC BLOOD PRESSURE: 69 MMHG | TEMPERATURE: 99.1 F | HEART RATE: 78 BPM | RESPIRATION RATE: 18 BRPM
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
## 2020-07-28 PROCEDURE — 99281 EMR DPT VST MAYX REQ PHY/QHP: CPT

## 2020-07-28 PROCEDURE — 99282 EMERGENCY DEPT VISIT SF MDM: CPT | Performed by: EMERGENCY MEDICINE

## 2020-07-28 NOTE — ED PROVIDER NOTES
History  Chief Complaint   Patient presents with   [place] [id number]     patient states he was stung by a wasp on his right hand about one hour ago   + swelling to right knuckles  took one Benadryl 22mg    22-year-old male presents to the ED  22-year-old male presents to the ED for evaluation of insect bite  A few hours prior to arrival patient was bitten to dorsal aspect of the 2nd MCP joint of his right hand by a wasp  Patient has had numerous wasp bite in the past   Patient denies any previous history of anaphylactic reactions  Mild swelling noted with some itchiness to the dorsal aspect of right hand  Patient took 25 mg Benadryl and came to the ED for further evaluation right hand swelling  History provided by:  Patient  Insect Bite   Associated symptoms: rash    Associated symptoms: no fever        Prior to Admission Medications   Prescriptions Last Dose Informant Patient Reported? Taking? AMLODIPINE BESYLATE PO   Yes No   Sig: Take 5 mg by mouth daily   amLODIPine (NORVASC) 5 mg tablet   No No   Sig: TAKE 1 TABLET BY MOUTH  DAILY   hydrocortisone 2 5 % ointment   No No   Sig: Apply topically 2 (two) times a day for 7 days   lisinopril-hydrochlorothiazide (PRINZIDE,ZESTORETIC) 20-12 5 MG per tablet   No No   Sig: TAKE 1 TABLET BY MOUTH  EVERY DAY   metoprolol tartrate (LOPRESSOR) 25 mg tablet   No No   Sig: TAKE 1 TABLET BY MOUTH  TWICE A DAY   mupirocin (BACTROBAN) 2 % cream   No No   Sig: Apply topically 3 (three) times a day   simvastatin (ZOCOR) 40 mg tablet   No No   Sig: TAKE 1 TABLET BY MOUTH  DAILY      Facility-Administered Medications: None       Past Medical History:   Diagnosis Date    Hyperlipidemia     Hypertension        Past Surgical History:   Procedure Laterality Date    A-V CARDIAC PACEMAKER INSERTION         Family History   Problem Relation Age of Onset    No Known Problems Mother      I have reviewed and agree with the history as documented      E-Cigarette/Vaping    E-Cigarette Use Never User      E-Cigarette/Vaping Substances     Social History     Tobacco Use    Smoking status: Former Smoker    Smokeless tobacco: Never Used   Substance Use Topics    Alcohol use: No    Drug use: No       Review of Systems   Constitutional: Negative for activity change, fatigue and fever  HENT: Negative for congestion, ear discharge and sore throat  Eyes: Negative for pain and redness  Respiratory: Negative for cough, chest tightness, shortness of breath and wheezing  Cardiovascular: Negative for chest pain  Gastrointestinal: Negative for abdominal pain, diarrhea, nausea and vomiting  Endocrine: Negative for cold intolerance  Genitourinary: Negative for dysuria and urgency  Musculoskeletal: Negative for arthralgias and back pain  Skin: Positive for rash  Neurological: Negative for dizziness, weakness and headaches  Psychiatric/Behavioral: Negative for agitation and behavioral problems  Physical Exam  Physical Exam   Constitutional: He is oriented to person, place, and time  He appears well-developed and well-nourished  HENT:   Head: Normocephalic and atraumatic  Nose: Nose normal    Mouth/Throat: Oropharynx is clear and moist    No signs of airway compromise noted  Eyes: Conjunctivae and EOM are normal    Neck: Normal range of motion  Neck supple  Cardiovascular: Normal rate, regular rhythm and normal heart sounds  Pulmonary/Chest: Effort normal and breath sounds normal    Abdominal: Soft  Bowel sounds are normal  He exhibits no distension  There is no tenderness  Musculoskeletal: Normal range of motion  Neurological: He is alert and oriented to person, place, and time  Skin: Skin is warm  Bite leslie noted over the dorsal aspect of 2nd MCP joint with some surrounding erythema  Psychiatric: He has a normal mood and affect  His behavior is normal  Judgment and thought content normal    Nursing note and vitals reviewed        Vital Signs  ED Triage Vitals [07/28/20 1819]   Temperature Pulse Respirations Blood Pressure SpO2   99 1 °F (37 3 °C) 78 18 113/69 98 %      Temp Source Heart Rate Source Patient Position - Orthostatic VS BP Location FiO2 (%)   Tympanic Monitor Sitting Right arm --      Pain Score       No Pain           Vitals:    07/28/20 1819   BP: 113/69   Pulse: 78   Patient Position - Orthostatic VS: Sitting         Visual Acuity      ED Medications  Medications - No data to display    Diagnostic Studies  Results Reviewed     None                 No orders to display              Procedures  Procedures         ED Course       US AUDIT      Most Recent Value   Initial Alcohol Screen: US AUDIT-C    2  How many drinks containing alcohol do you have on a typical day you are drinking? 0 Filed at: 07/28/2020 1822   3b  FEMALE Any Age, or MALE 65+: How often do you have 4 or more drinks on one occassion? 0 Filed at: 07/28/2020 1822   Audit-C Score  0 Filed at: 07/28/2020 1822                  ALEJO/DAST-10      Most Recent Value   How many times in the past year have you    Used an illegal drug or used a prescription medication for non-medical reasons? Never Filed at: 07/28/2020 5690                                MDM  Number of Diagnoses or Management Options  Insect bite: new and requires workup     Amount and/or Complexity of Data Reviewed  Tests in the medicine section of CPT®: ordered and reviewed  Review and summarize past medical records: yes  Independent visualization of images, tracings, or specimens: yes    Risk of Complications, Morbidity, and/or Mortality  General comments: History and physical is consistent with insect bite with some superficial skin swelling  Patient placed on hydrocortisone ointment and discharged home with follow-up to PCP as needed  Close return instructions given to return to the ER for any worsening symptoms  Patient agrees with discharge plan  Patient well appearing at time of discharge        Patient Progress  Patient progress: stable        Disposition  Final diagnoses:   Insect bite     Time reflects when diagnosis was documented in both MDM as applicable and the Disposition within this note     Time User Action Codes Description Comment    7/28/2020  7:18 PM Ace Coronado Add Duran Leahy  XXXA] Insect bite       ED Disposition     ED Disposition Condition Date/Time Comment    Discharge Stable Tue Jul 28, 2020  7:18 PM Tara Guevara discharge to home/self care  Follow-up Information     Follow up With Specialties Details Why [address], [place] Family Medicine In 1 week  [employer]  [address] [place] [address]  [phone]            Discharge Medication List as of 7/28/2020  7:19 PM      CONTINUE these medications which have CHANGED    Details   hydrocortisone 2 5 % ointment Apply topically 2 (two) times a day for 7 days, Starting Tue 7/28/2020, Until Tue 8/4/2020, Normal         CONTINUE these medications which have NOT CHANGED    Details   !! amLODIPine (NORVASC) 5 mg tablet TAKE 1 TABLET BY MOUTH  DAILY, Normal      !! AMLODIPINE BESYLATE PO Take 5 mg by mouth daily, Historical Med      lisinopril-hydrochlorothiazide (PRINZIDE,ZESTORETIC) 20-12 5 MG per tablet TAKE 1 TABLET BY MOUTH  EVERY DAY, Normal      metoprolol tartrate (LOPRESSOR) 25 mg tablet TAKE 1 TABLET BY MOUTH  TWICE A DAY, Normal      mupirocin (BACTROBAN) 2 % cream Apply topically 3 (three) times a day, Starting Sat 7/14/2018, Normal      simvastatin (ZOCOR) 40 mg tablet TAKE 1 TABLET BY MOUTH  DAILY, Normal       !! - Potential duplicate medications found  Please discuss with provider  No discharge procedures on file      PDMP Review     None          ED Provider  Electronically Signed by           Minerva Mobley DO  07/28/20 2033

## 2020-07-28 NOTE — DISCHARGE INSTRUCTIONS
Please take a list of all of your medications and discharge paperwork with you to all of your follow-up medical visits  Please take all of your medications as directed  Please call your family doctor or return to the ER if you have increased shortness of breath, chest pain, fevers, chills, nausea, vomiting, diarrhea, or any other worsening symptoms  Insect Bite or Sting   WHAT YOU NEED TO KNOW:   Most insect bites and stings are not dangerous and go away without treatment  Your symptoms may be mild, or you may develop anaphylaxis  Anaphylaxis is a sudden, life-threatening reaction that needs immediate treatment  Common examples of insects that bite or sting are bees, ticks, mosquitoes, spiders, and ants  Insect bites or stings can lead to diseases such as malaria, West Nile virus, Lyme disease, or [place] Spotted Fever  DISCHARGE INSTRUCTIONS:   Call 911 for signs or symptoms of anaphylaxis,  such as trouble breathing, swelling in your mouth or throat, or wheezing  You may also have itching, a rash, hives, or feel like you are going to faint  Return to the emergency department if:   · You are stung on your tongue or in your throat  · A white area forms around the bite  · You are sweating badly or have body pain  · You think you were bitten or stung by a poisonous insect  Contact your healthcare provider if:   · You have a fever  · The area becomes red, warm, tender, and swollen beyond the area of the bite or sting  · You have questions or concerns about your condition or care  Medicines:   · Antihistamines  decrease itching and rash  · Epinephrine  is used to treat severe allergic reactions such as anaphylaxis  · Take your medicine as directed  Contact your healthcare provider if you think your medicine is not helping or if you have side effects  Tell him of her if you are allergic to any medicine  Keep a list of the medicines, vitamins, and herbs you take   Include the amounts, and when and why you take them  Bring the list or the pill bottles to follow-up visits  Carry your medicine list with you in case of an emergency  Steps to take for signs or symptoms of anaphylaxis:   · Immediately  give 1 shot of epinephrine only into the outer thigh muscle  · Leave the shot in place  as directed  Your healthcare provider may recommend you leave it in place for up to 10 seconds before you remove it  This helps make sure all of the epinephrine is delivered  · Call 911 and go to the emergency department,  even if the shot improved symptoms  Do not drive yourself  Bring the used epinephrine shot with you  Safety precautions to take if you are at risk for anaphylaxis:   · Keep 2 shots of epinephrine with you at all times  You may need a second shot, because epinephrine only works for about 20 minutes and symptoms may return  Your healthcare provider can show you and family members how to give the shot  Check the expiration date every month and replace it before it expires  · Create an action plan  Your healthcare provider can help you create a written plan that explains the allergy and an emergency plan to treat a reaction  The plan explains when to give a second epinephrine shot if symptoms return or do not improve after the first  Give copies of the action plan and emergency instructions to family members, work and school staff, and  providers  Show them how to give a shot of epinephrine  · Carry medical alert identification  Wear medical alert jewelry or carry a card that says you have an insect allergy  Ask your healthcare provider where to get these items  If an insect bites or stings you:   · Remove the stinger  Scrape the stinger out with your fingernail, edge of a credit card, or a knife blade  Do not squeeze the wound  Gently wash the area with soap and water  · Remove the tick    Ticks must be removed as soon as possible so you do not get diseases passed through tick bites  Ask your healthcare provider for more information on tick bites and how to remove ticks  Care for a bite or sting wound:   · Elevate the affected area  Prop the wound above the level of your heart, if possible  Elevate the area for 10 to 20 minutes each hour or as directed by your healthcare provider  · Use compresses  Soak a clean washcloth in cold water, wring it out, and put it on the bite or sting  Use the compress for 10 to 20 minutes each hour or as directed by your healthcare provider  After 24 to 48 hours, change to warm compresses  · Apply a paste  Add water to baking soda to make a thick paste  Put the paste on the area for 5 minutes  Rinse gently to remove the paste  Prevent another insect bite or sting:   · Do not wear bright-colored or flower-print clothing when you plan to spend time outdoors  Do not use hairspray, perfumes, or aftershave  · Do not leave food out  · Empty any standing water and wash container with soap and water every 2 days  · Put screens on all open windows and doors  · Put insect repellent that contains DEET on skin that is showing when you go outside  Put insect repellent at the top of your boots, bottom of pant legs, and sleeve cuffs  Wear long sleeves, pants, and shoes  · Use citronella candles outdoors to help keep mosquitoes away  Put a tick and flea collar on pets  Follow up with your healthcare provider as directed:  Write down your questions so you remember to ask them during your visits  © 2017 [facility] Information is for End User's use only and may not be sold, redistributed or otherwise used for commercial purposes  All illustrations and images included in CareNotes® are the copyrighted property of A D A M , Inc  or [employer]  The above information is an  only  It is not intended as medical advice for individual conditions or treatments   Talk to your doctor, nurse or pharmacist before following any medical regimen to see if it is safe and effective for you

## 2020-08-17 ENCOUNTER — TRANSCRIBE ORDERS (OUTPATIENT)
Dept: ADMINISTRATIVE | Facility: HOSPITAL | Age: 85
End: 2020-08-17

## 2020-08-17 ENCOUNTER — APPOINTMENT (OUTPATIENT)
Dept: LAB | Facility: HOSPITAL | Age: 85
End: 2020-08-17
Payer: MEDICARE

## 2020-08-17 DIAGNOSIS — I10 ESSENTIAL HYPERTENSION, MALIGNANT: ICD-10-CM

## 2020-08-17 DIAGNOSIS — I42.9 FAMILIAL CARDIOMYOPATHY (HCC): ICD-10-CM

## 2020-08-17 DIAGNOSIS — E78.2 MIXED HYPERLIPIDEMIA: Primary | ICD-10-CM

## 2020-08-17 DIAGNOSIS — E78.2 MIXED HYPERLIPIDEMIA: ICD-10-CM

## 2020-08-17 LAB
ALBUMIN SERPL BCP-MCNC: 3.4 G/DL (ref 3.5–5)
ALP SERPL-CCNC: 35 U/L (ref 46–116)
ALT SERPL W P-5'-P-CCNC: 31 U/L (ref 12–78)
ANION GAP SERPL CALCULATED.3IONS-SCNC: 8 MMOL/L (ref 4–13)
AST SERPL W P-5'-P-CCNC: 27 U/L (ref 5–45)
BASOPHILS # BLD AUTO: 0.06 THOUSANDS/ΜL (ref 0–0.1)
BASOPHILS NFR BLD AUTO: 1 % (ref 0–1)
BILIRUB SERPL-MCNC: 1.4 MG/DL (ref 0.2–1)
BUN SERPL-MCNC: 22 MG/DL (ref 5–25)
CALCIUM SERPL-MCNC: 8.5 MG/DL (ref 8.3–10.1)
CHLORIDE SERPL-SCNC: 106 MMOL/L (ref 100–108)
CHOLEST SERPL-MCNC: 97 MG/DL (ref 50–200)
CK SERPL-CCNC: 81 U/L (ref 39–308)
CO2 SERPL-SCNC: 27 MMOL/L (ref 21–32)
CREAT SERPL-MCNC: 1.08 MG/DL (ref 0.6–1.3)
EOSINOPHIL # BLD AUTO: 0.26 THOUSAND/ΜL (ref 0–0.61)
EOSINOPHIL NFR BLD AUTO: 3 % (ref 0–6)
ERYTHROCYTE [DISTWIDTH] IN BLOOD BY AUTOMATED COUNT: 12.7 % (ref 11.6–15.1)
GFR SERPL CREATININE-BSD FRML MDRD: 61 ML/MIN/1.73SQ M
GLUCOSE P FAST SERPL-MCNC: 131 MG/DL (ref 65–99)
HCT VFR BLD AUTO: 40.2 % (ref 36.5–49.3)
HDLC SERPL-MCNC: 26 MG/DL
HGB BLD-MCNC: 13.6 G/DL (ref 12–17)
IMM GRANULOCYTES # BLD AUTO: 0.05 THOUSAND/UL (ref 0–0.2)
IMM GRANULOCYTES NFR BLD AUTO: 1 % (ref 0–2)
LDLC SERPL CALC-MCNC: 48 MG/DL (ref 0–100)
LYMPHOCYTES # BLD AUTO: 1.46 THOUSANDS/ΜL (ref 0.6–4.47)
LYMPHOCYTES NFR BLD AUTO: 17 % (ref 14–44)
MAGNESIUM SERPL-MCNC: 2.2 MG/DL (ref 1.6–2.6)
MCH RBC QN AUTO: 32.9 PG (ref 26.8–34.3)
MCHC RBC AUTO-ENTMCNC: 33.8 G/DL (ref 31.4–37.4)
MCV RBC AUTO: 97 FL (ref 82–98)
MONOCYTES # BLD AUTO: 0.5 THOUSAND/ΜL (ref 0.17–1.22)
MONOCYTES NFR BLD AUTO: 6 % (ref 4–12)
NEUTROPHILS # BLD AUTO: 6.37 THOUSANDS/ΜL (ref 1.85–7.62)
NEUTS SEG NFR BLD AUTO: 72 % (ref 43–75)
NONHDLC SERPL-MCNC: 71 MG/DL
NRBC BLD AUTO-RTO: 0 /100 WBCS
NT-PROBNP SERPL-MCNC: 278 PG/ML
PLATELET # BLD AUTO: 186 THOUSANDS/UL (ref 149–390)
PMV BLD AUTO: 9.7 FL (ref 8.9–12.7)
POTASSIUM SERPL-SCNC: 3.6 MMOL/L (ref 3.5–5.3)
PROT SERPL-MCNC: 6.8 G/DL (ref 6.4–8.2)
RBC # BLD AUTO: 4.14 MILLION/UL (ref 3.88–5.62)
SODIUM SERPL-SCNC: 141 MMOL/L (ref 136–145)
TRIGL SERPL-MCNC: 114 MG/DL
WBC # BLD AUTO: 8.7 THOUSAND/UL (ref 4.31–10.16)

## 2020-08-17 PROCEDURE — 83880 ASSAY OF NATRIURETIC PEPTIDE: CPT

## 2020-08-17 PROCEDURE — 83735 ASSAY OF MAGNESIUM: CPT | Performed by: INTERNAL MEDICINE

## 2020-08-17 PROCEDURE — 36415 COLL VENOUS BLD VENIPUNCTURE: CPT

## 2020-08-17 PROCEDURE — 82550 ASSAY OF CK (CPK): CPT

## 2020-08-17 PROCEDURE — 80061 LIPID PANEL: CPT

## 2020-08-17 PROCEDURE — 85025 COMPLETE CBC W/AUTO DIFF WBC: CPT

## 2020-08-17 PROCEDURE — 80053 COMPREHEN METABOLIC PANEL: CPT

## 2020-09-24 DIAGNOSIS — I10 ESSENTIAL HYPERTENSION: ICD-10-CM

## 2020-09-24 DIAGNOSIS — E78.5 HYPERLIPIDEMIA, UNSPECIFIED HYPERLIPIDEMIA TYPE: ICD-10-CM

## 2020-09-24 RX ORDER — LISINOPRIL AND HYDROCHLOROTHIAZIDE 20; 12.5 MG/1; MG/1
TABLET ORAL
Qty: 90 TABLET | Refills: 3 | OUTPATIENT
Start: 2020-09-24

## 2020-09-24 RX ORDER — SIMVASTATIN 40 MG
TABLET ORAL
Qty: 90 TABLET | Refills: 3 | OUTPATIENT
Start: 2020-09-24

## 2020-09-24 RX ORDER — AMLODIPINE BESYLATE 5 MG/1
TABLET ORAL
Qty: 90 TABLET | Refills: 3 | OUTPATIENT
Start: 2020-09-24

## 2020-10-16 DIAGNOSIS — E78.5 HYPERLIPIDEMIA, UNSPECIFIED HYPERLIPIDEMIA TYPE: ICD-10-CM

## 2020-10-16 DIAGNOSIS — I10 ESSENTIAL HYPERTENSION: ICD-10-CM

## 2020-10-16 RX ORDER — SIMVASTATIN 40 MG
TABLET ORAL
Qty: 90 TABLET | Refills: 3 | OUTPATIENT
Start: 2020-10-16

## 2020-10-16 RX ORDER — AMLODIPINE BESYLATE 5 MG/1
TABLET ORAL
Qty: 90 TABLET | Refills: 3 | OUTPATIENT
Start: 2020-10-16

## 2020-10-16 RX ORDER — LISINOPRIL AND HYDROCHLOROTHIAZIDE 20; 12.5 MG/1; MG/1
TABLET ORAL
Qty: 90 TABLET | Refills: 3 | OUTPATIENT
Start: 2020-10-16

## 2020-10-19 DIAGNOSIS — I10 ESSENTIAL HYPERTENSION: ICD-10-CM

## 2020-10-19 RX ORDER — AMLODIPINE BESYLATE 5 MG/1
5 TABLET ORAL DAILY
Qty: 90 TABLET | Refills: 3 | OUTPATIENT
Start: 2020-10-19

## 2020-10-19 RX ORDER — LISINOPRIL AND HYDROCHLOROTHIAZIDE 20; 12.5 MG/1; MG/1
1 TABLET ORAL DAILY
Qty: 90 TABLET | Refills: 3 | OUTPATIENT
Start: 2020-10-19

## 2020-10-21 ENCOUNTER — OFFICE VISIT (OUTPATIENT)
Dept: FAMILY MEDICINE CLINIC | Facility: CLINIC | Age: 85
End: 2020-10-21
Payer: MEDICARE

## 2020-10-21 ENCOUNTER — TELEPHONE (OUTPATIENT)
Dept: FAMILY MEDICINE CLINIC | Facility: CLINIC | Age: 85
End: 2020-10-21

## 2020-10-21 VITALS
HEIGHT: 68 IN | RESPIRATION RATE: 16 BRPM | BODY MASS INDEX: 31.37 KG/M2 | SYSTOLIC BLOOD PRESSURE: 134 MMHG | WEIGHT: 207 LBS | TEMPERATURE: 96.3 F | DIASTOLIC BLOOD PRESSURE: 72 MMHG | HEART RATE: 76 BPM

## 2020-10-21 DIAGNOSIS — Z00.00 ENCOUNTER FOR MEDICARE ANNUAL WELLNESS EXAM: ICD-10-CM

## 2020-10-21 DIAGNOSIS — M17.0 OSTEOARTHRITIS OF BOTH KNEES, UNSPECIFIED OSTEOARTHRITIS TYPE: ICD-10-CM

## 2020-10-21 DIAGNOSIS — I10 ESSENTIAL HYPERTENSION: ICD-10-CM

## 2020-10-21 DIAGNOSIS — E78.5 HYPERLIPIDEMIA, UNSPECIFIED HYPERLIPIDEMIA TYPE: ICD-10-CM

## 2020-10-21 DIAGNOSIS — Z95.0 PACEMAKER: ICD-10-CM

## 2020-10-21 DIAGNOSIS — I42.9 CARDIOMYOPATHY, UNSPECIFIED TYPE (HCC): Primary | ICD-10-CM

## 2020-10-21 PROCEDURE — G0438 PPPS, INITIAL VISIT: HCPCS | Performed by: FAMILY MEDICINE

## 2020-10-21 PROCEDURE — 99214 OFFICE O/P EST MOD 30 MIN: CPT | Performed by: FAMILY MEDICINE

## 2020-10-21 RX ORDER — POTASSIUM CHLORIDE 20 MEQ/1
20 TABLET, EXTENDED RELEASE ORAL DAILY
COMMUNITY
End: 2021-04-22

## 2020-10-21 RX ORDER — FUROSEMIDE 40 MG/1
40 TABLET ORAL DAILY
COMMUNITY
End: 2020-10-21 | Stop reason: SDUPTHER

## 2020-10-22 RX ORDER — SIMVASTATIN 40 MG
40 TABLET ORAL DAILY
Qty: 90 TABLET | Refills: 3 | Status: SHIPPED | OUTPATIENT
Start: 2020-10-22 | End: 2021-07-08

## 2020-10-22 RX ORDER — LISINOPRIL AND HYDROCHLOROTHIAZIDE 20; 12.5 MG/1; MG/1
1 TABLET ORAL DAILY
Qty: 90 TABLET | Refills: 3 | Status: SHIPPED | OUTPATIENT
Start: 2020-10-22 | End: 2021-07-08

## 2020-10-22 RX ORDER — FUROSEMIDE 40 MG/1
40 TABLET ORAL DAILY
Qty: 90 TABLET | Refills: 0 | Status: SHIPPED | OUTPATIENT
Start: 2020-10-22 | End: 2022-03-28 | Stop reason: SDUPTHER

## 2020-12-28 ENCOUNTER — TELEPHONE (OUTPATIENT)
Dept: FAMILY MEDICINE CLINIC | Facility: CLINIC | Age: 85
End: 2020-12-28

## 2020-12-28 DIAGNOSIS — Z20.822 EXPOSURE TO COVID-19 VIRUS: Primary | ICD-10-CM

## 2020-12-29 DIAGNOSIS — Z20.822 EXPOSURE TO COVID-19 VIRUS: ICD-10-CM

## 2020-12-29 PROCEDURE — U0003 INFECTIOUS AGENT DETECTION BY NUCLEIC ACID (DNA OR RNA); SEVERE ACUTE RESPIRATORY SYNDROME CORONAVIRUS 2 (SARS-COV-2) (CORONAVIRUS DISEASE [COVID-19]), AMPLIFIED PROBE TECHNIQUE, MAKING USE OF HIGH THROUGHPUT TECHNOLOGIES AS DESCRIBED BY CMS-2020-01-R: HCPCS | Performed by: FAMILY MEDICINE

## 2020-12-30 ENCOUNTER — TELEPHONE (OUTPATIENT)
Dept: WOUND CARE | Facility: HOSPITAL | Age: 85
End: 2020-12-30

## 2020-12-30 LAB — SARS-COV-2 RNA SPEC QL NAA+PROBE: NOT DETECTED

## 2021-01-26 ENCOUNTER — OFFICE VISIT (OUTPATIENT)
Dept: PODIATRY | Facility: CLINIC | Age: 86
End: 2021-01-26
Payer: MEDICARE

## 2021-01-26 VITALS — BODY MASS INDEX: 31.37 KG/M2 | WEIGHT: 207 LBS | HEIGHT: 68 IN | RESPIRATION RATE: 17 BRPM

## 2021-01-26 DIAGNOSIS — M79.672 PAIN IN BOTH FEET: ICD-10-CM

## 2021-01-26 DIAGNOSIS — I70.209 PERIPHERAL ARTERIOSCLEROSIS (HCC): ICD-10-CM

## 2021-01-26 DIAGNOSIS — M77.42 METATARSALGIA OF BOTH FEET: Primary | ICD-10-CM

## 2021-01-26 DIAGNOSIS — M77.41 METATARSALGIA OF BOTH FEET: Primary | ICD-10-CM

## 2021-01-26 DIAGNOSIS — B35.1 ONYCHOMYCOSIS: ICD-10-CM

## 2021-01-26 DIAGNOSIS — M79.671 PAIN IN BOTH FEET: ICD-10-CM

## 2021-01-26 DIAGNOSIS — B35.9 DERMATOPHYTOSIS: ICD-10-CM

## 2021-01-26 PROCEDURE — 99202 OFFICE O/P NEW SF 15 MIN: CPT | Performed by: PODIATRIST

## 2021-01-26 RX ORDER — AMMONIUM LACTATE 12 G/100G
LOTION TOPICAL 2 TIMES DAILY PRN
Qty: 400 G | Refills: 1 | Status: SHIPPED | OUTPATIENT
Start: 2021-01-26 | End: 2022-03-02 | Stop reason: ALTCHOICE

## 2021-02-10 ENCOUNTER — IMMUNIZATIONS (OUTPATIENT)
Dept: FAMILY MEDICINE CLINIC | Facility: HOSPITAL | Age: 86
End: 2021-02-10

## 2021-02-10 DIAGNOSIS — Z23 ENCOUNTER FOR IMMUNIZATION: Primary | ICD-10-CM

## 2021-02-10 PROCEDURE — 91301 SARS-COV-2 / COVID-19 MRNA VACCINE (MODERNA) 100 MCG: CPT

## 2021-02-10 PROCEDURE — 0011A SARS-COV-2 / COVID-19 MRNA VACCINE (MODERNA) 100 MCG: CPT

## 2021-02-22 DIAGNOSIS — I10 ESSENTIAL HYPERTENSION: ICD-10-CM

## 2021-03-10 ENCOUNTER — IMMUNIZATIONS (OUTPATIENT)
Dept: FAMILY MEDICINE CLINIC | Facility: HOSPITAL | Age: 86
End: 2021-03-10

## 2021-03-10 DIAGNOSIS — Z23 ENCOUNTER FOR IMMUNIZATION: Primary | ICD-10-CM

## 2021-03-10 PROCEDURE — 0012A SARS-COV-2 / COVID-19 MRNA VACCINE (MODERNA) 100 MCG: CPT

## 2021-03-10 PROCEDURE — 91301 SARS-COV-2 / COVID-19 MRNA VACCINE (MODERNA) 100 MCG: CPT

## 2021-04-06 ENCOUNTER — OFFICE VISIT (OUTPATIENT)
Dept: PODIATRY | Facility: CLINIC | Age: 86
End: 2021-04-06
Payer: MEDICARE

## 2021-04-06 VITALS
WEIGHT: 207 LBS | DIASTOLIC BLOOD PRESSURE: 72 MMHG | BODY MASS INDEX: 31.37 KG/M2 | RESPIRATION RATE: 17 BRPM | HEIGHT: 68 IN | HEART RATE: 76 BPM | SYSTOLIC BLOOD PRESSURE: 134 MMHG

## 2021-04-06 DIAGNOSIS — M79.671 PAIN IN BOTH FEET: ICD-10-CM

## 2021-04-06 DIAGNOSIS — I70.209 PERIPHERAL ARTERIOSCLEROSIS (HCC): Primary | ICD-10-CM

## 2021-04-06 DIAGNOSIS — B35.1 ONYCHOMYCOSIS: ICD-10-CM

## 2021-04-06 DIAGNOSIS — M79.672 PAIN IN BOTH FEET: ICD-10-CM

## 2021-04-06 PROCEDURE — 11721 DEBRIDE NAIL 6 OR MORE: CPT | Performed by: PODIATRIST

## 2021-04-06 NOTE — PROGRESS NOTES
Assessment/Plan:  Pain upon ambulation  Dermatophytosis  Peripheral artery disease  Mycosis of nail  Metatarsalgia      Plan  Foot exam performed  Patient educated on condition  All nails debrided  Abnormal skin debrided  Procedures performed without pain or complication            Diagnoses and all orders for this visit:     Metatarsalgia of both feet     Onychomycosis     Pain in both feet     Peripheral arteriosclerosis (HCC)     Dermatophytosis  -     ammonium lactate (LAC-HYDRIN) 12 % lotion; Apply topically 2 (two) times a day as needed for dry skin            Subjective:  Patient has pain in his feet  He has pain when he wears shoes    He has painful dry          Allergies   Allergen Reactions    Penicillins      Tetanus-Diphth-Acell Pertussis [Tetanus-Diphth-Acell Pertussis]              Current Outpatient Medications:     amLODIPine (NORVASC) 5 mg tablet, TAKE 1 TABLET BY MOUTH  DAILY, Disp: 90 tablet, Rfl: 3    ammonium lactate (LAC-HYDRIN) 12 % lotion, Apply topically 2 (two) times a day as needed for dry skin, Disp: 400 g, Rfl: 1    furosemide (LASIX) 40 mg tablet, Take 1 tablet (40 mg total) by mouth daily Take once daily three times a week on M,W,F, Disp: 90 tablet, Rfl: 0    hydrocortisone 2 5 % ointment, Apply topically 2 (two) times a day for 7 days, Disp: 28 g, Rfl: 0    lisinopril-hydrochlorothiazide (PRINZIDE,ZESTORETIC) 20-12 5 MG per tablet, Take 1 tablet by mouth daily, Disp: 90 tablet, Rfl: 3    metoprolol tartrate (LOPRESSOR) 25 mg tablet, TAKE 1 TABLET BY MOUTH  TWICE A DAY, Disp: 180 tablet, Rfl: 3    mupirocin (BACTROBAN) 2 % cream, Apply topically 3 (three) times a day, Disp: 15 g, Rfl: 0    potassium chloride (K-DUR,KLOR-CON) 20 mEq tablet, Take 20 mEq by mouth daily Take one tablet three times a week, Disp: , Rfl:     simvastatin (ZOCOR) 40 mg tablet, Take 1 tablet (40 mg total) by mouth daily, Disp: 90 tablet, Rfl: 3         Patient Active Problem List   Diagnosis  Cardiomyopathy ([facility] [address] )    Essential hypertension    Hyperlipidemia    Pacemaker    Osteoarthritis of both knees             Patient ID: Pernell Moya is a 80 y o  male      HPI     The following portions of the patient's history were reviewed and updated as appropriate:      family history includes No Known Problems in his mother        reports that he has quit smoking  He has never used smokeless tobacco  He reports that he does not drink alcohol or use drugs       Objective:  Patient's shoes and socks removed  Foot Exam     General  General Appearance: appears stated age and healthy   Orientation: alert and oriented to person, place, and time   Affect: appropriate   Gait: antalgic         Right Foot/Ankle      Inspection and Palpation  Tenderness: metatarsals   Swelling: dorsum   Arch: pes planus  Hammertoes: fifth toe  Skin Exam: callus, dry skin and skin changes;      Neurovascular  Dorsalis pedis: 1+  Posterior tibial: 1+  Saphenous nerve sensation: diminished  Tibial nerve sensation: diminished  Superficial peroneal nerve sensation: diminished  Deep peroneal nerve sensation: diminished  Sural nerve sensation: diminished        Left Foot/Ankle       Inspection and Palpation  Tenderness: metatarsals   Swelling: dorsum   Arch: pes planus  Hammertoes: fifth toe  Skin Exam: callus, dry skin and skin changes;      Neurovascular  Dorsalis pedis: 1+  Posterior tibial: 1+  Saphenous nerve sensation: diminished  Tibial nerve sensation: diminished  Superficial peroneal nerve sensation: diminished  Deep peroneal nerve sensation: diminished  Sural nerve sensation: diminished           Physical Exam  Vitals signs and nursing note reviewed  Constitutional:       Appearance: Normal appearance  Cardiovascular:      Pulses:           Dorsalis pedis pulses are 1+ on the right side and 1+ on the left side  Posterior tibial pulses are 1+ on the right side and 1+ on the left side        Comments: Q, 9 findings bilateral   Negative digital hair  Positive abnormal cooling  Feet:      Right foot:      Skin integrity: Callus and dry skin present  Left foot:      Skin integrity: Callus and dry skin present  Skin:     Comments: Profound mycosis of nail  All nails are thick with debris and mycosis  They have distal lysis  Skin of the foot and ankle is xerotic  There is exfoliation  This is consistent with lichenification of chronic edema    Secondary overgrowth of fungus probable   Neurological:      Mental Status: He is alert

## 2021-04-22 RX ORDER — POTASSIUM CHLORIDE 20 MEQ/1
TABLET, EXTENDED RELEASE ORAL
COMMUNITY
Start: 2020-12-04 | End: 2022-03-28 | Stop reason: SDUPTHER

## 2021-05-06 ENCOUNTER — TRANSCRIBE ORDERS (OUTPATIENT)
Dept: ADMINISTRATIVE | Facility: HOSPITAL | Age: 86
End: 2021-05-06

## 2021-05-06 ENCOUNTER — APPOINTMENT (OUTPATIENT)
Dept: LAB | Facility: HOSPITAL | Age: 86
End: 2021-05-06
Payer: MEDICARE

## 2021-05-06 DIAGNOSIS — R60.0 LOCALIZED EDEMA: Primary | ICD-10-CM

## 2021-05-06 DIAGNOSIS — R60.0 LOCALIZED EDEMA: ICD-10-CM

## 2021-05-06 LAB
ANION GAP SERPL CALCULATED.3IONS-SCNC: 9 MMOL/L (ref 4–13)
BUN SERPL-MCNC: 21 MG/DL (ref 5–25)
CALCIUM SERPL-MCNC: 8.6 MG/DL (ref 8.3–10.1)
CHLORIDE SERPL-SCNC: 104 MMOL/L (ref 100–108)
CO2 SERPL-SCNC: 27 MMOL/L (ref 21–32)
CREAT SERPL-MCNC: 1.09 MG/DL (ref 0.6–1.3)
GFR SERPL CREATININE-BSD FRML MDRD: 60 ML/MIN/1.73SQ M
GLUCOSE SERPL-MCNC: 133 MG/DL (ref 65–140)
MAGNESIUM SERPL-MCNC: 2.1 MG/DL (ref 1.6–2.6)
NT-PROBNP SERPL-MCNC: 156 PG/ML
POTASSIUM SERPL-SCNC: 3.5 MMOL/L (ref 3.5–5.3)
SODIUM SERPL-SCNC: 140 MMOL/L (ref 136–145)

## 2021-05-06 PROCEDURE — 83735 ASSAY OF MAGNESIUM: CPT

## 2021-05-06 PROCEDURE — 83880 ASSAY OF NATRIURETIC PEPTIDE: CPT

## 2021-05-06 PROCEDURE — 36415 COLL VENOUS BLD VENIPUNCTURE: CPT

## 2021-05-06 PROCEDURE — 80048 BASIC METABOLIC PNL TOTAL CA: CPT

## 2021-06-15 ENCOUNTER — OFFICE VISIT (OUTPATIENT)
Dept: PODIATRY | Facility: CLINIC | Age: 86
End: 2021-06-15
Payer: MEDICARE

## 2021-06-15 VITALS — RESPIRATION RATE: 17 BRPM | WEIGHT: 207 LBS | BODY MASS INDEX: 31.37 KG/M2 | HEIGHT: 68 IN

## 2021-06-15 DIAGNOSIS — M79.671 PAIN IN BOTH FEET: ICD-10-CM

## 2021-06-15 DIAGNOSIS — B35.1 ONYCHOMYCOSIS: ICD-10-CM

## 2021-06-15 DIAGNOSIS — M79.672 PAIN IN BOTH FEET: ICD-10-CM

## 2021-06-15 DIAGNOSIS — I70.209 PERIPHERAL ARTERIOSCLEROSIS (HCC): Primary | ICD-10-CM

## 2021-06-15 PROCEDURE — 11721 DEBRIDE NAIL 6 OR MORE: CPT | Performed by: PODIATRIST

## 2021-06-15 NOTE — PROGRESS NOTES
Assessment/Plan:  Pain upon ambulation   Dermatophytosis   Peripheral artery disease   Mycosis of nail   Metatarsalgia      Plan   Foot exam performed   Patient educated on condition   All nails debrided   Abnormal skin debrided  Procedures performed without pain or complication            Diagnoses and all orders for this visit:     Metatarsalgia of both feet     Onychomycosis     Pain in both feet     Peripheral arteriosclerosis (HCC)     Dermatophytosis  -     ammonium lactate (LAC-HYDRIN) 12 % lotion;  Apply topically 2 (two) times a day as needed for dry skin            Subjective:  Patient has pain in his feet   He has pain when he wears shoes   He has painful dry             Allergies   Allergen Reactions    Penicillins      Tetanus-Diphth-Acell Pertussis [Tetanus-Diphth-Acell Pertussis]              Current Outpatient Medications:     amLODIPine (NORVASC) 5 mg tablet, TAKE 1 TABLET BY MOUTH  DAILY, Disp: 90 tablet, Rfl: 3    ammonium lactate (LAC-HYDRIN) 12 % lotion, Apply topically 2 (two) times a day as needed for dry skin, Disp: 400 g, Rfl: 1    furosemide (LASIX) 40 mg tablet, Take 1 tablet (40 mg total) by mouth daily Take once daily three times a week on M,W,F, Disp: 90 tablet, Rfl: 0    hydrocortisone 2 5 % ointment, Apply topically 2 (two) times a day for 7 days, Disp: 28 g, Rfl: 0    lisinopril-hydrochlorothiazide (PRINZIDE,ZESTORETIC) 20-12 5 MG per tablet, Take 1 tablet by mouth daily, Disp: 90 tablet, Rfl: 3    metoprolol tartrate (LOPRESSOR) 25 mg tablet, TAKE 1 TABLET BY MOUTH  TWICE A DAY, Disp: 180 tablet, Rfl: 3    mupirocin (BACTROBAN) 2 % cream, Apply topically 3 (three) times a day, Disp: 15 g, Rfl: 0    potassium chloride (K-DUR,KLOR-CON) 20 mEq tablet, Take 20 mEq by mouth daily Take one tablet three times a week, Disp: , Rfl:     simvastatin (ZOCOR) 40 mg tablet, Take 1 tablet (40 mg total) by mouth daily, Disp: 90 tablet, Rfl: 3           Patient Active Problem List Diagnosis    Cardiomyopathy ([facility] [address] )    Essential hypertension    Hyperlipidemia    Pacemaker    Osteoarthritis of both knees             Patient ID: Uziel Antonio is a 80 y  o  male      HPI     The following portions of the patient's history were reviewed and updated as appropriate:      family history includes No Known Problems in his mother        reports that he has quit smoking  He has never used smokeless tobacco  He reports that he does not drink alcohol or use drugs       Objective:  Patient's shoes and socks removed    Foot Exam     General  General Appearance: appears stated age and healthy   Orientation: alert and oriented to person, place, and time   Affect: appropriate   Gait: antalgic         Right Foot/Ankle      Inspection and Palpation  Tenderness: metatarsals   Swelling: dorsum   Arch: pes planus  Hammertoes: fifth toe  Skin Exam: callus, dry skin and skin changes;      Neurovascular  Dorsalis pedis: 1+  Posterior tibial: 1+  Saphenous nerve sensation: diminished  Tibial nerve sensation: diminished  Superficial peroneal nerve sensation: diminished  Deep peroneal nerve sensation: diminished  Sural nerve sensation: diminished        Left Foot/Ankle       Inspection and Palpation  Tenderness: metatarsals   Swelling: dorsum   Arch: pes planus  Hammertoes: fifth toe  Skin Exam: callus, dry skin and skin changes;      Neurovascular  Dorsalis pedis: 1+  Posterior tibial: 1+  Saphenous nerve sensation: diminished  Tibial nerve sensation: diminished  Superficial peroneal nerve sensation: diminished  Deep peroneal nerve sensation: diminished  Sural nerve sensation: diminished           Physical Exam  Vitals signs and nursing note reviewed  Constitutional:       Appearance: Normal appearance     Cardiovascular:      Pulses:           Dorsalis pedis pulses are 1+ on the right side and 1+ on the left side         Posterior tibial pulses are 1+ on the right side and 1+ on the left side       Comments: Q, 9 findings bilateral   Negative digital hair   Positive abnormal cooling  Feet:      Right foot:      Skin integrity: Callus and dry skin present       Left foot:      Skin integrity: Callus and dry skin present     Skin:     Comments: Profound mycosis of nail   All nails are thick with debris and mycosis   They have distal lysis   Skin of the foot and ankle is xerotic   There is exfoliation   This is consistent with lichenification of chronic edema   Secondary overgrowth of fungus probable   Neurological:      Mental Status: He is alert

## 2021-07-07 DIAGNOSIS — I10 ESSENTIAL HYPERTENSION: ICD-10-CM

## 2021-07-07 DIAGNOSIS — E78.5 HYPERLIPIDEMIA, UNSPECIFIED HYPERLIPIDEMIA TYPE: ICD-10-CM

## 2021-07-08 ENCOUNTER — OFFICE VISIT (OUTPATIENT)
Dept: FAMILY MEDICINE CLINIC | Facility: CLINIC | Age: 86
End: 2021-07-08
Payer: MEDICARE

## 2021-07-08 VITALS
BODY MASS INDEX: 31.22 KG/M2 | OXYGEN SATURATION: 96 % | RESPIRATION RATE: 18 BRPM | SYSTOLIC BLOOD PRESSURE: 108 MMHG | HEART RATE: 70 BPM | DIASTOLIC BLOOD PRESSURE: 58 MMHG | HEIGHT: 68 IN | WEIGHT: 206 LBS

## 2021-07-08 DIAGNOSIS — E78.5 HYPERLIPIDEMIA, UNSPECIFIED HYPERLIPIDEMIA TYPE: ICD-10-CM

## 2021-07-08 DIAGNOSIS — I42.9 CARDIOMYOPATHY, UNSPECIFIED TYPE (HCC): ICD-10-CM

## 2021-07-08 DIAGNOSIS — M17.0 OSTEOARTHRITIS OF BOTH KNEES, UNSPECIFIED OSTEOARTHRITIS TYPE: Primary | ICD-10-CM

## 2021-07-08 DIAGNOSIS — I10 ESSENTIAL HYPERTENSION: ICD-10-CM

## 2021-07-08 PROCEDURE — 99214 OFFICE O/P EST MOD 30 MIN: CPT | Performed by: FAMILY MEDICINE

## 2021-07-08 RX ORDER — LISINOPRIL AND HYDROCHLOROTHIAZIDE 20; 12.5 MG/1; MG/1
1 TABLET ORAL DAILY
Qty: 90 TABLET | Refills: 3 | Status: SHIPPED | OUTPATIENT
Start: 2021-07-08 | End: 2022-07-05 | Stop reason: SDUPTHER

## 2021-07-08 RX ORDER — SIMVASTATIN 40 MG
TABLET ORAL
Qty: 90 TABLET | Refills: 3 | Status: SHIPPED | OUTPATIENT
Start: 2021-07-08 | End: 2022-03-28 | Stop reason: SDUPTHER

## 2021-07-08 NOTE — PROGRESS NOTES
Assessment/Plan:     Diagnoses and all orders for this visit:    Osteoarthritis of both knees, unspecified osteoarthritis type  Comments:  Discussed going back to orthopedic surgery, but pt declines  Orders:  -     Diclofenac Sodium (VOLTAREN) 1 %; Apply 2 g topically 4 (four) times a day    Cardiomyopathy, unspecified type [facility])  Comments:  managed by cardiologist, Dr Luis Alonso  Hyperlipidemia, unspecified hyperlipidemia type  Comments:  continue statin  Essential hypertension  Comments:  controlled on current medications  Subjective:      Patient ID: José Andujar is a 80 y o  male  HPI  Mr Rosy Jauregui is an 81 yo M with past medical history of permanent pacemaker placement, cardiomyopathy, hypertension, hyperlipidemia lower extremity edema and osteoarthritis who presents today to discuss chronic medical problems  Pt follows with cardiologist Dr Luis Alonso at [facility]  Currently on amlodipine, lisinopril-hctz, metoprolol and simvastatin  Saw orthopedic surgeon, Dr Ella Gonzalez, for bilateral knee osteoarthritis  Was given a steroid injection last year, but states it didn't help  Continues to have intermittent knee pain, worse with weather changes  The following portions of the patient's history were reviewed and updated as appropriate: allergies, current medications, past family history, past medical history, past social history, past surgical history and problem list     Review of Systems   Constitutional: Negative  HENT: Negative  Eyes: Negative  Respiratory: Negative  Cardiovascular: Negative  Gastrointestinal: Negative  Endocrine: Negative  Genitourinary: Negative  Musculoskeletal: Negative  B/l knee pain   Neurological: Negative  Hematological: Negative  Psychiatric/Behavioral: Negative            Objective:      /58   Pulse 70   Resp 18   Ht 5' 8" (1 727 m)   Wt 93 4 kg (206 lb)   SpO2 96%   BMI 31 32 kg/m²          Physical Exam  Constitutional:       General: He is not in acute distress  Appearance: He is well-developed  He is not diaphoretic  HENT:      Head: Normocephalic and atraumatic  Eyes:      General: No scleral icterus  Right eye: No discharge  Left eye: No discharge  Conjunctiva/sclera: Conjunctivae normal    Cardiovascular:      Rate and Rhythm: Normal rate and regular rhythm  Pulses: Normal pulses  Pulmonary:      Effort: Pulmonary effort is normal  No respiratory distress  Breath sounds: Normal breath sounds  No stridor  No wheezing, rhonchi or rales  Chest:      Chest wall: No tenderness  Musculoskeletal:         General: No swelling, tenderness, deformity or signs of injury  Normal range of motion  Cervical back: Normal range of motion  Right lower leg: No edema  Left lower leg: No edema  Skin:     General: Skin is warm  Neurological:      Mental Status: He is alert and oriented to person, place, and time  Psychiatric:         Mood and Affect: Mood normal          Behavior: Behavior normal          Thought Content:  Thought content normal          Judgment: Judgment normal

## 2021-08-14 DIAGNOSIS — I10 ESSENTIAL HYPERTENSION: ICD-10-CM

## 2021-08-14 RX ORDER — AMLODIPINE BESYLATE 5 MG/1
5 TABLET ORAL DAILY
Qty: 90 TABLET | Refills: 1 | Status: SHIPPED | OUTPATIENT
Start: 2021-08-14 | End: 2022-01-10

## 2021-08-14 NOTE — TELEPHONE ENCOUNTER
Requested medication(s) are due for refill today: Yes  Patient has already received a courtesy refill: No  Other reason request has been forwarded to provider: Protocol passed but we have never filled for him

## 2021-08-20 ENCOUNTER — OFFICE VISIT (OUTPATIENT)
Dept: FAMILY MEDICINE CLINIC | Facility: CLINIC | Age: 86
End: 2021-08-20
Payer: MEDICARE

## 2021-08-20 VITALS
RESPIRATION RATE: 20 BRPM | TEMPERATURE: 98 F | SYSTOLIC BLOOD PRESSURE: 104 MMHG | HEART RATE: 67 BPM | HEIGHT: 68 IN | BODY MASS INDEX: 31.37 KG/M2 | WEIGHT: 207 LBS | DIASTOLIC BLOOD PRESSURE: 60 MMHG | OXYGEN SATURATION: 99 %

## 2021-08-20 DIAGNOSIS — R60.0 BILATERAL EDEMA OF LOWER EXTREMITY: ICD-10-CM

## 2021-08-20 DIAGNOSIS — M17.0 OSTEOARTHRITIS OF BOTH KNEES, UNSPECIFIED OSTEOARTHRITIS TYPE: Primary | ICD-10-CM

## 2021-08-20 DIAGNOSIS — H61.23 BILATERAL IMPACTED CERUMEN: ICD-10-CM

## 2021-08-20 DIAGNOSIS — I42.9 CARDIOMYOPATHY, UNSPECIFIED TYPE (HCC): ICD-10-CM

## 2021-08-20 PROCEDURE — 99214 OFFICE O/P EST MOD 30 MIN: CPT | Performed by: FAMILY MEDICINE

## 2021-08-20 NOTE — PROGRESS NOTES
Assessment/Plan:       Diagnoses and all orders for this visit:    Osteoarthritis of both knees, unspecified osteoarthritis type  Comments:  recommend follow up with ortho  he is resistant to oral pain medications and injections into joint  recommend topical voltaren  Orders:  -     Ambulatory referral to Orthopedic Surgery; Future    Cardiomyopathy, unspecified type [facility])  Comments:  managed by cardiologist, Dr Luis Alonso  Bilateral edema of lower extremity  Comments:  He has been non compliant with his lasix  counseled on importance of taking it daily  advised leg elevation and compression socks  Bilateral impacted cerumen  Comments:  i did attempt ear flush today, but was unsuccessful  recommend debrox drops and return in 1 week  Subjective:      Patient ID: José Andujar is a 80 y o  male  HPI  Mr Rosy Jauregui is an 79 yo male with history of cardiomyopathy, hypertension, osteoarthritis of both knees and hyperlipidemia who presents today to discuss multiple issues  He does wear hearing aids, but has noticed that his hearing has been worsening  Would like to get checked for ear wax  He does not have any ear pain or discomfort  Denies dizziness, balance problems, ear discharge  He has history of chronic bilateral osteoarthritis of the knees  Has had shots in his knees in the past  States that his knees have been bothering him more than usual lately  He does not want anymore injections in his knees and is resistant to pain medications  He was prescribed diclofenac cream in the past, but he has not used it yet  He has history of cardiomyopathy managed by his cardiologist, Dr Luis Alonso  Was advised to take Lasix 40mg daily and increase if needed for weight gain  Zina Corley has been retaining water in his lower extremities and has noted significant swelling over the past couple of weeks  He has not been compliant with taking his lasix daily because it makes him urinate too much  Takes it every other day  Denies STONER, SOB, orthopnea  The following portions of the patient's history were reviewed and updated as appropriate: allergies, current medications, past family history, past medical history, past social history, past surgical history and problem list     Review of Systems   Constitutional: Negative  HENT: Positive for hearing loss  Eyes: Negative  Respiratory: Negative  Cardiovascular: Positive for leg swelling  Gastrointestinal: Negative  Endocrine: Negative  Genitourinary: Negative  Musculoskeletal: Positive for arthralgias  Neurological: Negative  Hematological: Negative  Psychiatric/Behavioral: Negative  Objective:      /60   Pulse 67   Temp 98 °F (36 7 °C)   Resp 20   Ht 5' 8" (1 727 m)   Wt 93 9 kg (207 lb)   SpO2 99%   BMI 31 47 kg/m²          Physical Exam  Constitutional:       General: He is not in acute distress  Appearance: He is well-developed  He is not diaphoretic  HENT:      Head: Normocephalic and atraumatic  Right Ear: There is impacted cerumen  Left Ear: There is impacted cerumen  Eyes:      General: No scleral icterus  Right eye: No discharge  Left eye: No discharge  Conjunctiva/sclera: Conjunctivae normal    Cardiovascular:      Rate and Rhythm: Normal rate and regular rhythm  Pulses: Normal pulses  Pulmonary:      Effort: Pulmonary effort is normal  No respiratory distress  Breath sounds: Normal breath sounds  No stridor  No wheezing, rhonchi or rales  Chest:      Chest wall: No tenderness  Musculoskeletal:         General: Normal range of motion  Cervical back: Normal range of motion  Right lower leg: Edema present  Left lower leg: Edema present  Skin:     General: Skin is warm  Neurological:      Mental Status: He is alert and oriented to person, place, and time     Psychiatric:         Mood and Affect: Mood normal          Behavior: Behavior normal  Thought Content:  Thought content normal          Judgment: Judgment normal

## 2021-08-20 NOTE — PATIENT INSTRUCTIONS
Leg swelling- Taking lasix 40mg (water pill) daily for the next week  Knee pain- Use diclofenac cream  Follow up with orthopedic surgeon  Ear wax- DEBROX drops, return in 1 week

## 2021-08-23 ENCOUNTER — TELEPHONE (OUTPATIENT)
Dept: FAMILY MEDICINE CLINIC | Facility: CLINIC | Age: 86
End: 2021-08-23

## 2021-08-23 NOTE — TELEPHONE ENCOUNTER
Friday had ears flushed  They told him to get  Debrox over the counter for ears     Daughter has a questions regarding this  Josef Alejandra has hearing aids       [facility] rear - [phone]

## 2021-08-24 ENCOUNTER — RA CDI HCC (OUTPATIENT)
Dept: OTHER | Facility: HOSPITAL | Age: 86
End: 2021-08-24

## 2021-08-24 NOTE — PROGRESS NOTES
[facility]  coding opportunities       Chart reviewed, no opportunity found: CHART REVIEWED, NO OPPORTUNITY FOUND                        Patients insurance company: [employer]

## 2021-08-30 ENCOUNTER — OFFICE VISIT (OUTPATIENT)
Dept: FAMILY MEDICINE CLINIC | Facility: CLINIC | Age: 86
End: 2021-08-30
Payer: MEDICARE

## 2021-08-30 VITALS
HEART RATE: 74 BPM | DIASTOLIC BLOOD PRESSURE: 60 MMHG | TEMPERATURE: 99.2 F | OXYGEN SATURATION: 98 % | BODY MASS INDEX: 30.31 KG/M2 | WEIGHT: 200 LBS | SYSTOLIC BLOOD PRESSURE: 110 MMHG | HEIGHT: 68 IN | RESPIRATION RATE: 20 BRPM

## 2021-08-30 DIAGNOSIS — H61.23 BILATERAL IMPACTED CERUMEN: Primary | ICD-10-CM

## 2021-08-30 PROCEDURE — 99213 OFFICE O/P EST LOW 20 MIN: CPT | Performed by: FAMILY MEDICINE

## 2021-08-30 PROCEDURE — 69209 REMOVE IMPACTED EAR WAX UNI: CPT | Performed by: FAMILY MEDICINE

## 2021-08-30 NOTE — PROGRESS NOTES
Assessment/Plan:       Diagnoses and all orders for this visit:    Bilateral impacted cerumen    Ear cerumen removal    Date/Time: 8/30/2021 2:19 PM  Performed by: Carlos Pineda MD  Authorized by: Carlos Pineda MD   [place] Protocol:  Consent: Verbal consent obtained  Consent given by: patient  Patient understanding: patient states understanding of the procedure being performed      Patient location:  Clinic  Procedure details:     Local anesthetic:  None    Location:  L ear and R ear    Procedure type: irrigation only    Post-procedure details:     Complication:  None    Hearing quality:  Improved    Patient tolerance of procedure: Tolerated well, no immediate complications        Subjective:      Patient ID: Anson Goss is a 80 y o  male  HPI  Jonathan Richardson is here today for ear flush for cerumen impaction  He has chronic hearing loss and wears hearing aids, but hearing has worsened recently due to cerumen impaction bilaterally  An ear flush was attempted last week, but was unsuccessful  He returns today after using debrox ear drops for the past week  Denies ear pain, discharge, headaches, dizziness, fevers, chills  The following portions of the patient's history were reviewed and updated as appropriate: allergies, current medications, past family history, past medical history, past social history, past surgical history and problem list     Review of Systems   Constitutional: Negative  HENT: Positive for hearing loss  Eyes: Negative  Respiratory: Negative  Cardiovascular: Negative  Gastrointestinal: Negative  Endocrine: Negative  Genitourinary: Negative  Musculoskeletal: Negative  Neurological: Negative  Hematological: Negative  Psychiatric/Behavioral: Negative            Objective:      /60   Pulse 74   Temp 99 2 °F (37 3 °C)   Resp 20   Ht 5' 8" (1 727 m)   Wt 90 7 kg (200 lb)   SpO2 98%   BMI 30 41 kg/m²          Physical Exam  Constitutional: General: He is not in acute distress  Appearance: He is well-developed  He is not diaphoretic  HENT:      Head: Normocephalic and atraumatic  Right Ear: Tympanic membrane, ear canal and external ear normal  There is impacted cerumen  Left Ear: Tympanic membrane, ear canal and external ear normal  There is impacted cerumen  Eyes:      General: No scleral icterus  Right eye: No discharge  Left eye: No discharge  Conjunctiva/sclera: Conjunctivae normal    Pulmonary:      Effort: Pulmonary effort is normal    Musculoskeletal:      Cervical back: Normal range of motion  Skin:     General: Skin is warm  Neurological:      Mental Status: He is alert and oriented to person, place, and time  Psychiatric:         Behavior: Behavior normal          Thought Content:  Thought content normal          Judgment: Judgment normal

## 2021-10-15 ENCOUNTER — HOSPITAL ENCOUNTER (EMERGENCY)
Facility: HOSPITAL | Age: 86
Discharge: HOME | End: 2021-10-15
Attending: EMERGENCY MEDICINE
Payer: MEDICARE

## 2021-10-15 VITALS
RESPIRATION RATE: 18 BRPM | HEART RATE: 70 BPM | DIASTOLIC BLOOD PRESSURE: 67 MMHG | SYSTOLIC BLOOD PRESSURE: 159 MMHG | WEIGHT: 203 LBS | TEMPERATURE: 97.4 F | BODY MASS INDEX: 30.87 KG/M2 | OXYGEN SATURATION: 98 %

## 2021-10-15 DIAGNOSIS — T16.1XXA FOREIGN BODY OF RIGHT EAR, INITIAL ENCOUNTER: Primary | ICD-10-CM

## 2021-10-15 PROCEDURE — 99282 EMERGENCY DEPT VISIT SF MDM: CPT | Performed by: EMERGENCY MEDICINE

## 2021-10-15 PROCEDURE — 99282 EMERGENCY DEPT VISIT SF MDM: CPT

## 2021-10-20 ENCOUNTER — VBI (OUTPATIENT)
Dept: FAMILY MEDICINE CLINIC | Facility: CLINIC | Age: 86
End: 2021-10-20

## 2021-12-01 ENCOUNTER — TELEPHONE (OUTPATIENT)
Dept: FAMILY MEDICINE CLINIC | Facility: CLINIC | Age: 86
End: 2021-12-01

## 2021-12-20 ENCOUNTER — OFFICE VISIT (OUTPATIENT)
Dept: FAMILY MEDICINE CLINIC | Facility: CLINIC | Age: 86
End: 2021-12-20
Payer: MEDICARE

## 2021-12-20 VITALS
SYSTOLIC BLOOD PRESSURE: 124 MMHG | BODY MASS INDEX: 28.79 KG/M2 | DIASTOLIC BLOOD PRESSURE: 80 MMHG | HEIGHT: 68 IN | HEART RATE: 88 BPM | WEIGHT: 190 LBS | OXYGEN SATURATION: 97 % | RESPIRATION RATE: 22 BRPM | TEMPERATURE: 97.8 F

## 2021-12-20 DIAGNOSIS — B34.9 VIRAL INFECTION, UNSPECIFIED: Primary | ICD-10-CM

## 2021-12-20 PROCEDURE — U0003 INFECTIOUS AGENT DETECTION BY NUCLEIC ACID (DNA OR RNA); SEVERE ACUTE RESPIRATORY SYNDROME CORONAVIRUS 2 (SARS-COV-2) (CORONAVIRUS DISEASE [COVID-19]), AMPLIFIED PROBE TECHNIQUE, MAKING USE OF HIGH THROUGHPUT TECHNOLOGIES AS DESCRIBED BY CMS-2020-01-R: HCPCS | Performed by: NURSE PRACTITIONER

## 2021-12-20 PROCEDURE — U0005 INFEC AGEN DETEC AMPLI PROBE: HCPCS | Performed by: NURSE PRACTITIONER

## 2021-12-20 PROCEDURE — 99213 OFFICE O/P EST LOW 20 MIN: CPT | Performed by: NURSE PRACTITIONER

## 2021-12-21 LAB — SARS-COV-2 RNA RESP QL NAA+PROBE: NEGATIVE

## 2021-12-28 ENCOUNTER — IMMUNIZATIONS (OUTPATIENT)
Dept: FAMILY MEDICINE CLINIC | Facility: HOSPITAL | Age: 86
End: 2021-12-28

## 2021-12-28 DIAGNOSIS — Z23 ENCOUNTER FOR IMMUNIZATION: Primary | ICD-10-CM

## 2021-12-28 PROCEDURE — 0064A COVID-19 MODERNA VACC 0.25 ML BOOSTER: CPT

## 2021-12-28 PROCEDURE — 91306 COVID-19 MODERNA VACC 0.25 ML BOOSTER: CPT

## 2022-01-07 DIAGNOSIS — I10 ESSENTIAL HYPERTENSION: ICD-10-CM

## 2022-01-10 RX ORDER — AMLODIPINE BESYLATE 5 MG/1
TABLET ORAL
Qty: 90 TABLET | Refills: 3 | Status: SHIPPED | OUTPATIENT
Start: 2022-01-10 | End: 2022-03-28 | Stop reason: SDUPTHER

## 2022-01-20 ENCOUNTER — TELEPHONE (OUTPATIENT)
Dept: FAMILY MEDICINE CLINIC | Facility: CLINIC | Age: 87
End: 2022-01-20

## 2022-01-26 ENCOUNTER — OFFICE VISIT (OUTPATIENT)
Dept: FAMILY MEDICINE CLINIC | Facility: CLINIC | Age: 87
End: 2022-01-26
Payer: MEDICARE

## 2022-01-26 VITALS
OXYGEN SATURATION: 99 % | HEART RATE: 74 BPM | TEMPERATURE: 98.1 F | BODY MASS INDEX: 30.46 KG/M2 | SYSTOLIC BLOOD PRESSURE: 132 MMHG | DIASTOLIC BLOOD PRESSURE: 80 MMHG | HEIGHT: 68 IN | RESPIRATION RATE: 22 BRPM | WEIGHT: 201 LBS

## 2022-01-26 DIAGNOSIS — E78.5 HYPERLIPIDEMIA, UNSPECIFIED HYPERLIPIDEMIA TYPE: ICD-10-CM

## 2022-01-26 DIAGNOSIS — F33.9 DEPRESSION, RECURRENT (HCC): Primary | ICD-10-CM

## 2022-01-26 DIAGNOSIS — Z00.00 MEDICARE ANNUAL WELLNESS VISIT, SUBSEQUENT: ICD-10-CM

## 2022-01-26 DIAGNOSIS — I42.9 CARDIOMYOPATHY, UNSPECIFIED TYPE (HCC): ICD-10-CM

## 2022-01-26 DIAGNOSIS — I70.209 PERIPHERAL ARTERIOSCLEROSIS (HCC): ICD-10-CM

## 2022-01-26 PROCEDURE — G0439 PPPS, SUBSEQ VISIT: HCPCS | Performed by: FAMILY MEDICINE

## 2022-01-26 PROCEDURE — 1123F ACP DISCUSS/DSCN MKR DOCD: CPT | Performed by: FAMILY MEDICINE

## 2022-01-26 NOTE — PATIENT INSTRUCTIONS
Medicare Preventive Visit Patient Instructions  Thank you for completing your Welcome to Medicare Visit or Medicare Annual Wellness Visit today  Your next wellness visit will be due in one year (1/27/2023)  The screening/preventive services that you may require over the next 5-10 years are detailed below  Some tests may not apply to you based off risk factors and/or age  Screening tests ordered at today's visit but not completed yet may show as past due  Also, please note that scanned in results may not display below  Preventive Screenings:  Service Recommendations Previous Testing/Comments   Colorectal Cancer Screening  · Colonoscopy    · Fecal Occult Blood Test (FOBT)/Fecal Immunochemical Test (FIT)  · Fecal DNA/Cologuard Test  · Flexible Sigmoidoscopy Age: 54-65 years old   Colonoscopy: every 10 years (May be performed more frequently if at higher risk)  OR  FOBT/FIT: every 1 year  OR  Cologuard: every 3 years  OR  Sigmoidoscopy: every 5 years  Screening may be recommended earlier than age 48 if at higher risk for colorectal cancer  Also, an individualized decision between you and your healthcare provider will decide whether screening between the ages of 74-80 would be appropriate   Colonoscopy: Not on file  FOBT/FIT: Not on file  Cologuard: Not on file  Sigmoidoscopy: Not on file    Screening Not Indicated     Prostate Cancer Screening Individualized decision between patient and health care provider in men between ages of 53-78   Medicare will cover every 12 months beginning on the day after your 50th birthday PSA: No results in last 5 years     Screening Not Indicated     Hepatitis C Screening Once for adults born between [facility]  More frequently in patients at high risk for Hepatitis C Hep C Antibody: Not on file        Diabetes Screening 1-2 times per year if you're at risk for diabetes or have pre-diabetes Fasting glucose: 131 mg/dL   A1C: No results in last 5 years    Screening Current Cholesterol Screening Once every 5 years if you don't have a lipid disorder  May order more often based on risk factors  Lipid panel: 08/17/2020    Screening Not Indicated  History Lipid Disorder      Other Preventive Screenings Covered by Medicare:  1  Abdominal Aortic Aneurysm (AAA) Screening: covered once if your at risk  You're considered to be at risk if you have a family history of AAA or a male between the age of 73-68 who smoking at least 100 cigarettes in your lifetime  2  Lung Cancer Screening: covers low dose CT scan once per year if you meet all of the following conditions: (1) Age 50-69; (2) No signs or symptoms of lung cancer; (3) Current smoker or have quit smoking within the last 15 years; (4) You have a tobacco smoking history of at least 30 pack years (packs per day x number of years you smoked); (5) You get a written order from a healthcare provider  3  Glaucoma Screening: covered annually if you're considered high risk: (1) You have diabetes OR (2) Family history of glaucoma OR (3)  aged 48 and older OR (3)  American aged 72 and older  3  Osteoporosis Screening: covered every 2 years if you meet one of the following conditions: (1) Have a vertebral abnormality; (2) On glucocorticoid therapy for more than 3 months; (3) Have primary hyperparathyroidism; (4) On osteoporosis medications and need to assess response to drug therapy  5  HIV Screening: covered annually if you're between the age of 12-76  Also covered annually if you are younger than 13 and older than 72 with risk factors for HIV infection  For pregnant patients, it is covered up to 3 times per pregnancy      Immunizations:  Immunization Recommendations   Influenza Vaccine Annual influenza vaccination during flu season is recommended for all persons aged >= 6 months who do not have contraindications   Pneumococcal Vaccine (Prevnar and Pneumovax)  * Prevnar = PCV13  * Pneumovax = PPSV23 Adults 25-60 years old: 1-3 doses may be recommended based on certain risk factors  Adults 72 years old: Prevnar (PCV13) vaccine recommended followed by Pneumovax (PPSV23) vaccine  If already received PPSV23 since turning 65, then PCV13 recommended at least one year after PPSV23 dose  Hepatitis B Vaccine 3 dose series if at intermediate or high risk (ex: diabetes, end stage renal disease, liver disease)   Tetanus (Td) Vaccine - COST NOT COVERED BY MEDICARE PART B Following completion of primary series, a booster dose should be given every 10 years to maintain immunity against tetanus  Td may also be given as tetanus wound prophylaxis  Tdap Vaccine - COST NOT COVERED BY MEDICARE PART B Recommended at least once for all adults  For pregnant patients, recommended with each pregnancy  Shingles Vaccine (Shingrix) - COST NOT COVERED BY MEDICARE PART B  2 shot series recommended in those aged 48 and above     Health Maintenance Due:  There are no preventive care reminders to display for this patient  Immunizations Due:      Topic Date Due    DTaP,Tdap,and Td Vaccines (1 - Tdap) Never done    Pneumococcal Vaccine: 65+ Years (1 of 2 - PPSV23) 03/13/2018    Influenza Vaccine (1) Never done     Advance Directives   What are advance directives? Advance directives are legal documents that state your wishes and plans for medical care  These plans are made ahead of time in case you lose your ability to make decisions for yourself  Advance directives can apply to any medical decision, such as the treatments you want, and if you want to donate organs  What are the types of advance directives? There are many types of advance directives, and each state has rules about how to use them  You may choose a combination of any of the following:  · Living will: This is a written record of the treatment you want  You can also choose which treatments you do not want, which to limit, and which to stop at a certain time   This includes surgery, medicine, IV fluid, and tube feedings  · Durable power of  for healthcare [place] [facility]): This is a written record that states who you want to make healthcare choices for you when you are unable to make them for yourself  This person, called a proxy, is usually a family member or a friend  You may choose more than 1 proxy  · Do not resuscitate (DNR) order:  A DNR order is used in case your heart stops beating or you stop breathing  It is a request not to have certain forms of treatment, such as CPR  A DNR order may be included in other types of advance directives  · Medical directive: This covers the care that you want if you are in a coma, near death, or unable to make decisions for yourself  You can list the treatments you want for each condition  Treatment may include pain medicine, surgery, blood transfusions, dialysis, IV or tube feedings, and a ventilator (breathing machine)  · Values history: This document has questions about your views, beliefs, and how you feel and think about life  This information can help others choose the care that you would choose  Why are advance directives important? An advance directive helps you control your care  Although spoken wishes may be used, it is better to have your wishes written down  Spoken wishes can be misunderstood, or not followed  Treatments may be given even if you do not want them  An advance directive may make it easier for your family to make difficult choices about your care  Depression   Depression  is a medical condition that causes feelings of sadness or hopelessness that do not go away  Depression may cause you to lose interest in things you used to enjoy  These feelings may interfere with your daily life  Call your local emergency number (911 in the [address] [facility]) if:   · You think about harming yourself or someone else  · You have done something on purpose to hurt yourself    The following resources are available at any time to help you, if needed:   · [employer] [place] Suicide Prevention Lifeline: 4-893-055-307.741.5344 (3-127-331-TSOR)   · Suicide Hotline: 9-465.765.9630 (2-387-OBTXOTA)   · For a list of international numbers: https://save org/find-help/international-resources/  Treatment for depression may include medicine to relieve depression  Medicine is often used together with therapy  Therapy is a way for you to talk about your feelings and anything that may be causing depression  Therapy can be done alone or in a group  It may also be done with family members or a significant other  · Get regular physical activity  · Create a regular sleep schedule  · Eat a variety of healthy foods  · Do not drink alcohol or use drugs  Weight Management   Why it is important to manage your weight:  Being overweight increases your risk of health conditions such as heart disease, high blood pressure, type 2 diabetes, and certain types of cancer  It can also increase your risk for osteoarthritis, sleep apnea, and other respiratory problems  Aim for a slow, steady weight loss  Even a small amount of weight loss can lower your risk of health problems  How to lose weight safely:  A safe and healthy way to lose weight is to eat fewer calories and get regular exercise  You can lose up about 1 pound a week by decreasing the number of calories you eat by 500 calories each day  Healthy meal plan for weight management:  A healthy meal plan includes a variety of foods, contains fewer calories, and helps you stay healthy  A healthy meal plan includes the following:  · Eat whole-grain foods more often  A healthy meal plan should contain fiber  Fiber is the part of grains, fruits, and vegetables that is not broken down by your body  Whole-grain foods are healthy and provide extra fiber in your diet  Some examples of whole-grain foods are whole-wheat breads and pastas, oatmeal, brown rice, and bulgur  · Eat a variety of vegetables every day    Include dark, leafy greens such as spinach, kale, duke greens, and mustard greens  Eat yellow and orange vegetables such as carrots, sweet potatoes, and winter squash  · Eat a variety of fruits every day  Choose fresh or canned fruit (canned in its own juice or light syrup) instead of juice  Fruit juice has very little or no fiber  · Eat low-fat dairy foods  Drink fat-free (skim) milk or 1% milk  Eat fat-free yogurt and low-fat cottage cheese  Try low-fat cheeses such as mozzarella and other reduced-fat cheeses  · Choose meat and other protein foods that are low in fat  Choose beans or other legumes such as split peas or lentils  Choose fish, skinless poultry (chicken or turkey), or lean cuts of red meat (beef or pork)  Before you cook meat or poultry, cut off any visible fat  · Use less fat and oil  Try baking foods instead of frying them  Add less fat, such as margarine, sour cream, regular salad dressing and mayonnaise to foods  Eat fewer high-fat foods  Some examples of high-fat foods include french fries, doughnuts, ice cream, and cakes  · Eat fewer sweets  Limit foods and drinks that are high in sugar  This includes candy, cookies, regular soda, and sweetened drinks  Exercise:  Exercise at least 30 minutes per day on most days of the week  Some examples of exercise include walking, biking, dancing, and swimming  You can also fit in more physical activity by taking the stairs instead of the elevator or parking farther away from stores  Ask your healthcare provider about the best exercise plan for you  © Copyright [employer] 2018 Information is for End User's use only and may not be sold, redistributed or otherwise used for commercial purposes   All illustrations and images included in CareNotes® are the copyrighted property of A D A M , Inc  or [address] [place] [address] [employer]

## 2022-01-26 NOTE — PROGRESS NOTES
Assessment and Plan:     Problem List Items Addressed This Visit        Cardiovascular and Mediastinum    Cardiomyopathy ([facility] )    Peripheral arteriosclerosis ([facility] )       Other    Dep[facility], [facility] ([facility] ) - Primary      Other Visit Diagnoses     Medicare annual wellness visit, subsequent            BMI Counseling: Body mass index is 30 56 kg/m²  The BMI is above normal  Nutrition recommendations include decreasing portion sizes, encouraging healthy choices of fruits and vegetables, decreasing fast food intake and consuming healthier snacks  Exercise recommendations include exercising 3-5 times per week  Rationale for BMI follow-up plan is due to patient being overweight or obese  Depression Screening and Follow-up Plan: Patient's depression screening was positive with a PHQ-2 score of 6  Their PHQ-9 score was 18  Patient assessed for underlying major depression  Brief counseling provided and recommend additional follow-up/re-evaluation next office visit  Patient declines further evaluation by mental health professional and/or medications  Brief counseling provided  Will re-evaluate at next office visit  Preventive health issues were discussed with patient, and age appropriate screening tests were ordered as noted in patient's After Visit Summary  Personalized health advice and appropriate referrals for health education or preventive services given if needed, as noted in patient's After Visit Summary       History of Present Illness:     Patient presents for Medicare Annual Wellness visit    Patient Care Team:  Landen Aguero MD as PCP - General (Family Medicine)  Pablo Alfonso MD (Inactive)     Problem List:     Patient Active Problem List   Diagnosis    Cardiomyopathy [facility])    Essential hypertension    Hyperlipidemia    Pacemaker    Osteoarthritis of both knees    Depression, recurrent ([facility] )    Peripheral arteriosclerosis ([facility] )      Past Medical and Surgical History:     Past Medical History: Diagnosis Date    Hyperlipidemia     Hypertension      Past Surgical History:   Procedure Laterality Date    A-V CARDIAC PACEMAKER INSERTION        Family History:     Family History   Problem Relation Age of Onset    No Known Problems Mother       Social History:     Social History     Socioeconomic History    Marital status: /Civil Union     Spouse name: None    Number of children: None    Years of education: None    Highest education level: None   Occupational History    None   Tobacco Use    Smoking status: Former Smoker     Quit date: 1960     Years since quittin 0    Smokeless tobacco: Never Used   Vaping Use    Vaping Use: Never used   Substance and Sexual Activity    Alcohol use: Never    Drug use: Never    Sexual activity: None   Other Topics Concern    None   Social History Narrative    None     Social Determinants of Health     Financial Resource Strain: Not on file   Food Insecurity: Not on file   Transportation Needs: Not on file   Physical Activity: Not on file   Stress: Not on file   Social Connections: Not on file   Intimate Partner Violence: Not on file   Housing Stability: Not on file      Medications and Allergies:     Current Outpatient Medications   Medication Sig Dispense Refill    amLODIPine (NORVASC) 5 mg tablet TAKE 1 TABLET BY MOUTH  DAILY 90 tablet 3    Diclofenac Sodium (VOLTAREN) 1 % Apply 2 g topically 4 (four) times a day 100 g 0    furosemide (LASIX) 40 mg tablet Take 1 tablet (40 mg total) by mouth daily Take once daily three times a week on M,W,F 90 tablet 0    metoprolol tartrate (LOPRESSOR) 25 mg tablet TAKE 1 TABLET BY MOUTH  TWICE DAILY 180 tablet 3    potassium chloride (K-DUR,KLOR-CON) 20 mEq tablet TAKE 1 TABLET BY MOUTH 3  TIMES WEEK      simvastatin (ZOCOR) 40 mg tablet TAKE 1 TABLET BY MOUTH  DAILY 90 tablet 3    ammonium lactate (LAC-HYDRIN) 12 % lotion Apply topically 2 (two) times a day as needed for dry skin 400 g 1    lisinopril-hydrochlorothiazide (PRINZIDE,ZESTORETIC) 20-12 5 MG per tablet TAKE 1 TABLET BY MOUTH  DAILY (Patient not taking: Reported on 8/30/2021) 90 tablet 3     No current facility-administered medications for this visit  Allergies   Allergen Reactions    Penicillins     Tetanus-Diphth-Acell Pertussis [Tetanus-Diphth-Acell Pertussis]       Immunizations:     Immunization History   Administered Date(s) Administered    COVID-19 MODERNA VACC 0 25 ML IM BOOSTER 12/28/2021    COVID-19 MODERNA VACC 0 5 ML IM 02/10/2021, 03/10/2021    Pneumococcal Conjugate 13-Valent 01/16/2018      Health Maintenance: There are no preventive care reminders to display for this patient  Topic Date Due    DTaP,Tdap,and Td Vaccines (1 - Tdap) Never done    Pneumococcal Vaccine: 65+ Years (1 of 2 - PPSV23) 03/13/2018    Influenza Vaccine (1) Never done      Medicare Health Risk Assessment:     /80   Pulse 74   Temp 98 1 °F (36 7 °C)   Resp 22   Ht 5' 8" (1 727 m)   Wt 91 2 kg (201 lb)   SpO2 99%   BMI 30 56 kg/m²      Jayshree Saeed is here for his Subsequent Wellness visit  Health Risk Assessment:   Patient rates overall health as very good  Patient feels that their physical health rating is same  Patient is satisfied with their life  Eyesight was rated as same  Hearing was rated as same  Patient feels that their emotional and mental health rating is slightly worse  Patients states they are sometimes angry  Patient states they are sometimes unusually tired/fatigued  Pain experienced in the last 7 days has been none  Patient states that he has experienced no weight loss or gain in last 6 months  Depression Screening:   PHQ-2 Score: 6  PHQ-9 Score: 18      Fall Risk Screening: In the past year, patient has experienced: no history of falling in past year      Home Safety:  Patient has trouble with stairs inside or outside of their home   Patient has working smoke alarms and has working carbon monoxide detector  Home safety hazards include: none  Nutrition:   Current diet is Regular  Medications:   Patient is not currently taking any over-the-counter supplements  Patient is able to manage medications  Activities of Daily Living (ADLs)/Instrumental Activities of Daily Living (IADLs):   Walk and transfer into and out of bed and chair?: Yes  Dress and groom yourself?: Yes    Bathe or shower yourself?: Yes    Feed yourself? Yes  Do your laundry/housekeeping?: Yes  Manage your money, pay your bills and track your expenses?: No  Make your own meals?: Yes    Do your own shopping?: No    ADL comments: Dianna Oh helps when needed  Step daughter     Previous Hospitalizations:   Any hospitalizations or ED visits within the last 12 months?: No      Advance Care Planning:   Living will: Yes    Durable POA for healthcare: Yes    Advanced directive: Yes      PREVENTIVE SCREENINGS      Cardiovascular Screening:    General: Screening Not Indicated and History Lipid Disorder      Diabetes Screening:     General: Risks and Benefits Discussed    Due for: Blood Glucose      Colorectal Cancer Screening:     General: Screening Not Indicated      Prostate Cancer Screening:    General: Screening Not Indicated      Osteoporosis Screening:    General: Screening Not Indicated      Abdominal Aortic Aneurysm (AAA) Screening:    Risk factors include: tobacco use        General: Screening Not Indicated      Lung Cancer Screening:     General: Screening Not Indicated      Hepatitis C Screening:    General: Screening Not Indicated    Screening, Brief Intervention, and Referral to Treatment (SBIRT)    Screening  Typical number of drinks in a day: 0  Typical number of drinks in a week: 0  Interpretation: Low risk drinking behavior      Single Item Drug Screening:  How often have you used an illegal drug (including marijuana) or a prescription medication for non-medical reasons in the past year? never    Single Item Drug Screen Score: 0  Interpretation: Negative screen for possible drug use disorder    Brief Intervention  Alcohol & drug use screenings were reviewed  No concerns regarding substance use disorder identified         Rik Velez MD

## 2022-03-02 ENCOUNTER — OFFICE VISIT (OUTPATIENT)
Dept: FAMILY MEDICINE CLINIC | Facility: CLINIC | Age: 87
End: 2022-03-02
Payer: MEDICARE

## 2022-03-02 VITALS
DIASTOLIC BLOOD PRESSURE: 64 MMHG | HEIGHT: 68 IN | BODY MASS INDEX: 30.16 KG/M2 | RESPIRATION RATE: 16 BRPM | OXYGEN SATURATION: 96 % | HEART RATE: 64 BPM | SYSTOLIC BLOOD PRESSURE: 100 MMHG | WEIGHT: 199 LBS | TEMPERATURE: 97.6 F

## 2022-03-02 DIAGNOSIS — I42.9 CARDIOMYOPATHY, UNSPECIFIED TYPE (HCC): ICD-10-CM

## 2022-03-02 DIAGNOSIS — I10 ESSENTIAL HYPERTENSION: ICD-10-CM

## 2022-03-02 DIAGNOSIS — F33.9 DEPRESSION, RECURRENT (HCC): Primary | ICD-10-CM

## 2022-03-02 PROCEDURE — 99214 OFFICE O/P EST MOD 30 MIN: CPT | Performed by: FAMILY MEDICINE

## 2022-03-02 RX ORDER — ESCITALOPRAM OXALATE 5 MG/1
5 TABLET ORAL DAILY
Qty: 30 TABLET | Refills: 1 | Status: SHIPPED | OUTPATIENT
Start: 2022-03-02 | End: 2022-03-28 | Stop reason: SDUPTHER

## 2022-03-02 NOTE — PROGRESS NOTES
Assessment/Plan:       Diagnoses and all orders for this visit:    Depression, recurrent ([facility] )  Comments: Will start low dose of lexapro today  Follow up in 1 month  Orders:  -     escitalopram (LEXAPRO) 5 mg tablet; Take 1 tablet (5 mg total) by mouth daily    Cardiomyopathy, unspecified type ([facility] )  Comments: There is some bilateral lower leg swelling  He has been taking his lasix every other day  Recommend he start taking it daily  Essential hypertension  Comments:  Controlled  Subjective:      Patient ID: Chan Myers is a 80 y o  male  HPI  Mihir Keane is a 79 yo male with history of depression, hypertension, cardiomyopathy, hyperlipidemia who presents today for follow up  He has not been on any medications for depression and has not been seeing a therapist  His symptoms have been worsening recently because his wife is on hospice and it has been very difficult for him  He is not getting along with his step daughter which is adding more stress  There has been worsening swelling in his legs lately  He is supposed to be taking lasix daily, but states he has been taking it intermittently because it makes him urinate too much  Denies chest pain, SOB  The following portions of the patient's history were reviewed and updated as appropriate: allergies, current medications, past family history, past medical history, past social history, past surgical history and problem list     Review of Systems   Constitutional: Negative  HENT: Negative  Eyes: Negative  Respiratory: Negative  Cardiovascular: Positive for leg swelling  Gastrointestinal: Negative  Endocrine: Negative  Genitourinary: Negative  Musculoskeletal: Negative  Neurological: Negative  Hematological: Negative  Psychiatric/Behavioral: Positive for dysphoric mood           Objective:      /64   Pulse 64   Temp 97 6 °F (36 4 °C)   Resp 16   Ht 5' 8" (1 727 m)   Wt 90 3 kg (199 lb)   SpO2 96% BMI 30 26 kg/m²          Physical Exam  Constitutional:       General: He is not in acute distress  Appearance: He is well-developed  He is not diaphoretic  HENT:      Head: Normocephalic and atraumatic  Eyes:      General: No scleral icterus  Right eye: No discharge  Left eye: No discharge  Conjunctiva/sclera: Conjunctivae normal    Cardiovascular:      Rate and Rhythm: Normal rate and regular rhythm  Pulses: Normal pulses  Pulmonary:      Effort: Pulmonary effort is normal  No respiratory distress  Breath sounds: Normal breath sounds  No stridor  No wheezing, rhonchi or rales  Chest:      Chest wall: No tenderness  Musculoskeletal:         General: Normal range of motion  Cervical back: Normal range of motion  Right lower leg: Edema present  Left lower leg: Edema present  Skin:     General: Skin is warm  Neurological:      Mental Status: He is alert and oriented to person, place, and time  Psychiatric:         Mood and Affect: Mood normal          Behavior: Behavior normal          Thought Content:  Thought content normal          Judgment: Judgment normal

## 2022-03-17 ENCOUNTER — OFFICE VISIT (OUTPATIENT)
Dept: PODIATRY | Facility: CLINIC | Age: 87
End: 2022-03-17
Payer: MEDICARE

## 2022-03-17 VITALS
RESPIRATION RATE: 16 BRPM | HEIGHT: 68 IN | DIASTOLIC BLOOD PRESSURE: 64 MMHG | BODY MASS INDEX: 30.16 KG/M2 | WEIGHT: 199 LBS | SYSTOLIC BLOOD PRESSURE: 100 MMHG | HEART RATE: 64 BPM

## 2022-03-17 DIAGNOSIS — M77.42 METATARSALGIA OF BOTH FEET: ICD-10-CM

## 2022-03-17 DIAGNOSIS — I70.209 PERIPHERAL ARTERIOSCLEROSIS (HCC): ICD-10-CM

## 2022-03-17 DIAGNOSIS — B35.9 DERMATOPHYTOSIS: ICD-10-CM

## 2022-03-17 DIAGNOSIS — L85.9 HYPERKERATOSIS: Primary | ICD-10-CM

## 2022-03-17 DIAGNOSIS — M79.671 PAIN IN BOTH FEET: ICD-10-CM

## 2022-03-17 DIAGNOSIS — B35.1 ONYCHOMYCOSIS: ICD-10-CM

## 2022-03-17 DIAGNOSIS — M77.41 METATARSALGIA OF BOTH FEET: ICD-10-CM

## 2022-03-17 DIAGNOSIS — M79.672 PAIN IN BOTH FEET: ICD-10-CM

## 2022-03-17 DIAGNOSIS — I83.893 VARICOSE VEINS OF BILATERAL LOWER EXTREMITIES WITH OTHER COMPLICATIONS: ICD-10-CM

## 2022-03-17 PROCEDURE — 99213 OFFICE O/P EST LOW 20 MIN: CPT | Performed by: PODIATRIST

## 2022-03-17 RX ORDER — UREA 40 %
CREAM (GRAM) TOPICAL DAILY
Qty: 227 G | Refills: 2 | Status: SHIPPED | OUTPATIENT
Start: 2022-03-17 | End: 2022-03-28 | Stop reason: SDUPTHER

## 2022-03-28 DIAGNOSIS — I10 ESSENTIAL HYPERTENSION: ICD-10-CM

## 2022-03-28 DIAGNOSIS — L85.9 HYPERKERATOSIS: ICD-10-CM

## 2022-03-28 DIAGNOSIS — F33.9 DEPRESSION, RECURRENT (HCC): ICD-10-CM

## 2022-03-28 DIAGNOSIS — E78.5 HYPERLIPIDEMIA, UNSPECIFIED HYPERLIPIDEMIA TYPE: ICD-10-CM

## 2022-03-29 ENCOUNTER — TELEPHONE (OUTPATIENT)
Dept: FAMILY MEDICINE CLINIC | Facility: CLINIC | Age: 87
End: 2022-03-29

## 2022-03-29 RX ORDER — UREA 40 %
CREAM (GRAM) TOPICAL DAILY
Qty: 227 G | Refills: 2 | Status: SHIPPED | OUTPATIENT
Start: 2022-03-29 | End: 2022-04-04 | Stop reason: ALTCHOICE

## 2022-03-29 RX ORDER — FUROSEMIDE 40 MG/1
40 TABLET ORAL DAILY
Qty: 90 TABLET | Refills: 0 | Status: SHIPPED | OUTPATIENT
Start: 2022-03-29

## 2022-03-29 RX ORDER — POTASSIUM CHLORIDE 20 MEQ/1
TABLET, EXTENDED RELEASE ORAL
Qty: 90 TABLET | Refills: 0 | Status: SHIPPED | OUTPATIENT
Start: 2022-03-29

## 2022-03-29 RX ORDER — SIMVASTATIN 40 MG
40 TABLET ORAL DAILY
Qty: 90 TABLET | Refills: 3 | Status: SHIPPED | OUTPATIENT
Start: 2022-03-29 | End: 2022-04-11 | Stop reason: SDUPTHER

## 2022-03-29 RX ORDER — ESCITALOPRAM OXALATE 5 MG/1
5 TABLET ORAL DAILY
Qty: 30 TABLET | Refills: 1 | Status: SHIPPED | OUTPATIENT
Start: 2022-03-29 | End: 2022-04-04 | Stop reason: SDDI

## 2022-03-29 RX ORDER — AMLODIPINE BESYLATE 5 MG/1
5 TABLET ORAL DAILY
Qty: 90 TABLET | Refills: 3 | Status: SHIPPED | OUTPATIENT
Start: 2022-03-29

## 2022-03-29 NOTE — TELEPHONE ENCOUNTER
Clarified by Dr Christina Coe  Lasix is to be taken daily on Monday Wednesday and Friday Spoke with pharmacy   NFA [email or web address]

## 2022-03-29 NOTE — TELEPHONE ENCOUNTER
PA was denied =but  [facility] does cover the medication at [facility] for an 85Gm tube    Pt's family will be made aware once prescription filled   [email or web address]

## 2022-03-29 NOTE — TELEPHONE ENCOUNTER
Prior Auth needed on [place] Cover My Meds  With [employer]  Key [id number]  ID [id number] Forms completed  Waiting for a response    anne

## 2022-03-29 NOTE — TELEPHONE ENCOUNTER
[facility] pharmacy is calling to get correct dosage on a medication    Please call patient back     Thank you

## 2022-04-04 ENCOUNTER — OFFICE VISIT (OUTPATIENT)
Dept: FAMILY MEDICINE CLINIC | Facility: CLINIC | Age: 87
End: 2022-04-04
Payer: MEDICARE

## 2022-04-04 VITALS
BODY MASS INDEX: 30.31 KG/M2 | HEIGHT: 68 IN | TEMPERATURE: 97.7 F | RESPIRATION RATE: 16 BRPM | SYSTOLIC BLOOD PRESSURE: 104 MMHG | OXYGEN SATURATION: 97 % | DIASTOLIC BLOOD PRESSURE: 62 MMHG | WEIGHT: 200 LBS | HEART RATE: 68 BPM

## 2022-04-04 DIAGNOSIS — I10 ESSENTIAL HYPERTENSION: ICD-10-CM

## 2022-04-04 DIAGNOSIS — F33.9 DEPRESSION, RECURRENT (HCC): ICD-10-CM

## 2022-04-04 DIAGNOSIS — R21 RASH AND NONSPECIFIC SKIN ERUPTION: Primary | ICD-10-CM

## 2022-04-04 PROCEDURE — 99214 OFFICE O/P EST MOD 30 MIN: CPT | Performed by: FAMILY MEDICINE

## 2022-04-04 RX ORDER — CLOTRIMAZOLE AND BETAMETHASONE DIPROPIONATE 10; .64 MG/G; MG/G
CREAM TOPICAL 2 TIMES DAILY
Qty: 30 G | Refills: 0 | Status: SHIPPED | OUTPATIENT
Start: 2022-04-04

## 2022-04-04 RX ORDER — CLOTRIMAZOLE AND BETAMETHASONE DIPROPIONATE 10; .64 MG/G; MG/G
CREAM TOPICAL 2 TIMES DAILY
Qty: 30 G | Refills: 0 | Status: SHIPPED | OUTPATIENT
Start: 2022-04-04 | End: 2022-04-04 | Stop reason: SDUPTHER

## 2022-04-04 NOTE — PROGRESS NOTES
Assessment/Plan:     Diagnoses and all orders for this visit:    1  Rash and nonspecific skin eruption  Comments:  Possible fungal rash  Will trial lotrisone  Return if symptoms worsen or fail to improve  Orders:  -     clotrimazole-betamethasone (LOTRISONE) 1-0 05 % cream; Apply topically 2 (two) times a day    2  Essential hypertension  Comments:  Controlled  Orders:  -     metoprolol tartrate (LOPRESSOR) 25 mg tablet; Take 1 tablet (25 mg total) by mouth 2 (two) times a day    3  Depression, recurrent ([facility] )  Comments:  stable on no medications  currently has grief from the loss of his wife, but states he is coping  Subjective:      Patient ID: Britton Merchant is a 80 y o  male  HPI  Doroteo Wallace presents today for follow up of chronic problems  He was started on lexapro for depression last month, but states he took it once and felt too tired so did not take it again  His wife passed away a couple of weeks ago  States he has good days and bad days since then, but overall is ok  He will be moving to [place] in a few months to be closer to his daughter  Also reports that he noticed a rash in the gluteal region which began 1 month ago  Has been putting desitin to the area with minimal relief  The following portions of the patient's history were reviewed and updated as appropriate: allergies, current medications, past family history, past medical history, past social history, past surgical history and problem list     Review of Systems   Constitutional: Negative  HENT: Negative  Eyes: Negative  Respiratory: Negative  Cardiovascular: Negative  Gastrointestinal: Negative  Endocrine: Negative  Genitourinary: Negative  Musculoskeletal: Negative  Skin: Positive for rash  Neurological: Negative  Hematological: Negative  Psychiatric/Behavioral: Positive for dysphoric mood           Objective:      /62   Pulse 68   Temp 97 7 °F (36 5 °C)   Resp 16   Ht 5' 8" (1 727 m)   Wt 90 7 kg (200 lb)   SpO2 97%   BMI 30 41 kg/m²          Physical Exam  Constitutional:       General: He is not in acute distress  Appearance: He is well-developed  He is not diaphoretic  HENT:      Head: Normocephalic and atraumatic  Eyes:      General: No scleral icterus  Right eye: No discharge  Left eye: No discharge  Conjunctiva/sclera: Conjunctivae normal    Cardiovascular:      Rate and Rhythm: Normal rate and regular rhythm  Pulses: Normal pulses  Heart sounds: Normal heart sounds  Pulmonary:      Effort: Pulmonary effort is normal       Breath sounds: Normal breath sounds  Musculoskeletal:      Cervical back: Normal range of motion  Skin:     General: Skin is warm  Findings: Rash (dry flaky erythematous rash in gluteal region) present  Neurological:      Mental Status: He is alert and oriented to person, place, and time  Psychiatric:         Behavior: Behavior normal          Thought Content:  Thought content normal          Judgment: Judgment normal

## 2022-04-11 DIAGNOSIS — E78.5 HYPERLIPIDEMIA, UNSPECIFIED HYPERLIPIDEMIA TYPE: ICD-10-CM

## 2022-04-11 RX ORDER — SIMVASTATIN 40 MG
40 TABLET ORAL DAILY
Qty: 90 TABLET | Refills: 3 | Status: SHIPPED | OUTPATIENT
Start: 2022-04-11

## 2022-06-02 NOTE — PROGRESS NOTES
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     Plan   Foot exam performed   Patient educated on condition   All nails debrided   Abnormal skin debrided    Procedures performed without pain or complication, urea cream ordered            Diagnoses and all orders for this visit:     Metatarsalgia of both feet     Onychomycosis     Pain in both feet     Peripheral arteriosclerosis ([facility] )     Dermatophytosis              Subjective:  Patient has pain in his feet   He has pain when he wears shoes   He has painful dry             Allergies   Allergen Reactions    Penicillins      Tetanus-Diphth-Acell Pertussis [Tetanus-Diphth-Acell Pertussis]              Current Outpatient Medications:     amLODIPine (NORVASC) 5 mg tablet, TAKE 1 TABLET BY MOUTH  DAILY, Disp: 90 tablet, Rfl: 3    ammonium lactate (LAC-HYDRIN) 12 % lotion, Apply topically 2 (two) times a day as needed for dry skin, Disp: 400 g, Rfl: 1    furosemide (LASIX) 40 mg tablet, Take 1 tablet (40 mg total) by mouth daily Take once daily three times a week on M,W,F, Disp: 90 tablet, Rfl: 0    hydrocortisone 2 5 % ointment, Apply topically 2 (two) times a day for 7 days, Disp: 28 g, Rfl: 0    lisinopril-hydrochlorothiazide (PRINZIDE,ZESTORETIC) 20-12 5 MG per tablet, Take 1 tablet by mouth daily, Disp: 90 tablet, Rfl: 3    metoprolol tartrate (LOPRESSOR) 25 mg tablet, TAKE 1 TABLET BY MOUTH  TWICE A DAY, Disp: 180 tablet, Rfl: 3    mupirocin (BACTROBAN) 2 % cream, Apply topically 3 (three) times a day, Disp: 15 g, Rfl: 0    potassium chloride (K-DUR,KLOR-CON) 20 mEq tablet, Take 20 mEq by mouth daily Take one tablet three times a week, Disp: , Rfl:     simvastatin (ZOCOR) 40 mg tablet, Take 1 tablet (40 mg total) by mouth daily, Disp: 90 tablet, Rfl: 3           Patient Active Problem List   Diagnosis    Cardiomyopathy ([facility] )  Essential hypertension    Hyperlipidemia    Pacemaker    Osteoarthritis of both knees             Patient ID: Uziel Antonio is a 80 y  o  male      HPI     The following portions of the patient's history were reviewed and updated as appropriate:      family history includes No Known Problems in his mother        reports that he has quit smoking  He has never used smokeless tobacco  He reports that he does not drink alcohol or use drugs       Objective:  Patient's shoes and socks removed    Foot Exam     General  General Appearance: appears stated age and healthy   Orientation: alert and oriented to person, place, and time   Affect: appropriate   Gait: antalgic         Right Foot/Ankle      Inspection and Palpation  Tenderness: metatarsals   Swelling: dorsum   Arch: pes planus  Hammertoes: fifth toe  Skin Exam: callus, dry skin and skin changes;      Neurovascular :  Varicosity noted bilateral lower extremities with extensive hyperkeratosis around the ankle with cobblestone appearance secondary to venous insufficiency  Dorsalis pedis: 1+  Posterior tibial: 1+  Saphenous nerve sensation: diminished  Tibial nerve sensation: diminished  Superficial peroneal nerve sensation: diminished  Deep peroneal nerve sensation: diminished  Sural nerve sensation: diminished        Left Foot/Ankle       Inspection and Palpation  Tenderness: metatarsals   Swelling: dorsum   Arch: pes planus  Hammertoes: fifth toe  Skin Exam: callus, dry skin and skin changes;      Neurovascular  Dorsalis pedis: 1+  Posterior tibial: 1+  Saphenous nerve sensation: diminished  Tibial nerve sensation: diminished  Superficial peroneal nerve sensation: diminished  Deep peroneal nerve sensation: diminished  Sural nerve sensation: diminished           Physical Exam  Vitals signs and nursing note reviewed  Constitutional:       Appearance: Normal appearance     Cardiovascular:      Pulses:           Dorsalis pedis pulses are 1+ on the right side and 1+ on the left side         Posterior tibial pulses are 1+ on the right side and 1+ on the left side       Comments: Q, 9 findings bilateral   Negative digital hair   Positive abnormal cooling  Feet:      Right foot:      Skin integrity: Callus and dry skin present       Left foot:      Skin integrity: Callus and dry skin present     Skin:     Comments: Profound mycosis of nail   All nails are thick with debris and mycosis   They have distal lysis   Skin of the foot and ankle is xerotic   There is exfoliation   This is consistent with lichenification of chronic edema   Secondary overgrowth of fungus probable   Neurological:      Mental Status: He is alert

## 2022-07-05 ENCOUNTER — TELEPHONE (OUTPATIENT)
Dept: FAMILY MEDICINE CLINIC | Facility: CLINIC | Age: 87
End: 2022-07-05

## 2022-07-05 DIAGNOSIS — I10 ESSENTIAL HYPERTENSION: ICD-10-CM

## 2022-07-05 RX ORDER — LISINOPRIL AND HYDROCHLOROTHIAZIDE 20; 12.5 MG/1; MG/1
1 TABLET ORAL DAILY
Qty: 90 TABLET | Refills: 0 | Status: SHIPPED | OUTPATIENT
Start: 2022-07-05

## 2022-07-05 NOTE — TELEPHONE ENCOUNTER
Called pt regarding appt today, 7/5/22  He has moved to [place] to live with his daughter  Asked that I mail them a release of health and refill his lisinopril to:    [facility]   [address] [facility], [address] [place] [address]  #[phone]    He has upcoming appt with new Dr but will be out of meds by then

## 2022-07-05 NOTE — TELEPHONE ENCOUNTER
07/05/22 11:15 AM        Thank you for your request  Your request has been received, reviewed, and the patient chart updated  The PCP has successfully been removed with a patient attribution note  This message will now be completed          Thank you  Chantel Cardoza